# Patient Record
Sex: MALE | Race: WHITE | NOT HISPANIC OR LATINO | Employment: FULL TIME | URBAN - METROPOLITAN AREA
[De-identification: names, ages, dates, MRNs, and addresses within clinical notes are randomized per-mention and may not be internally consistent; named-entity substitution may affect disease eponyms.]

---

## 2017-01-26 ENCOUNTER — OFFICE VISIT (OUTPATIENT)
Dept: URGENT CARE | Facility: MEDICAL CENTER | Age: 37
End: 2017-01-26
Payer: COMMERCIAL

## 2017-01-26 PROCEDURE — 99213 OFFICE O/P EST LOW 20 MIN: CPT

## 2018-05-16 ENCOUNTER — OFFICE VISIT (OUTPATIENT)
Dept: FAMILY MEDICINE CLINIC | Facility: CLINIC | Age: 38
End: 2018-05-16
Payer: COMMERCIAL

## 2018-05-16 VITALS
BODY MASS INDEX: 27 KG/M2 | WEIGHT: 188.6 LBS | HEART RATE: 84 BPM | HEIGHT: 70 IN | DIASTOLIC BLOOD PRESSURE: 84 MMHG | TEMPERATURE: 97.7 F | SYSTOLIC BLOOD PRESSURE: 132 MMHG

## 2018-05-16 DIAGNOSIS — E78.00 HYPERCHOLESTEROLEMIA: ICD-10-CM

## 2018-05-16 DIAGNOSIS — Z00.00 ANNUAL PHYSICAL EXAM: Primary | ICD-10-CM

## 2018-05-16 DIAGNOSIS — E78.1 ESSENTIAL HYPERTRIGLYCERIDEMIA: ICD-10-CM

## 2018-05-16 PROCEDURE — 99385 PREV VISIT NEW AGE 18-39: CPT | Performed by: FAMILY MEDICINE

## 2018-05-16 NOTE — PROGRESS NOTES
Assessment/Plan:    Essential hypertriglyceridemia  Check labs    Hypercholesterolemia  Check labs       Diagnoses and all orders for this visit:    Annual physical exam  Comments:  normal exam    Essential hypertriglyceridemia  -     Comprehensive metabolic panel; Future  -     Lipid panel; Future  -     TSH, 3rd generation with T4 reflex; Future    Hypercholesterolemia  -     Comprehensive metabolic panel; Future  -     Lipid panel; Future  -     TSH, 3rd generation with T4 reflex; Future          Subjective:      Patient ID: Jaden Lawrence is a 42 y o  male  41 yo male here for PE and to be established in the practice  - pt with a hx of hyperlipidemia and a strong fam hx of CAD  Pt was on a statin from [facility] in the past but stopped several years ago  Pt runs 1 5 mi several times a week and lifts weights  No CP, palp, lightheadedness or other CV symptoms with or without exertion  - pt denies any other GI,  or extremity complaints  - I reviewed PMHx, PSHx, FamHx and Soc Hx with pt  The following portions of the patient's history were reviewed and updated as appropriate:   He  has no past medical history on file  He   Patient Active Problem List    Diagnosis Date Noted    Essential hypertriglyceridemia 10/29/2013    Hypercholesterolemia 10/29/2013     He  has no past surgical history on file  His family history includes Asthma in his brother; Heart attack in his mother; Heart disease in his mother; Heart disease (age of onset: 39) in his father; Hypertension in his mother  He  reports that he has never smoked  His smokeless tobacco use includes Chew  He reports that he drinks about 1 8 oz of alcohol per week   He reports that he does not use drugs  No current outpatient prescriptions on file  No current facility-administered medications for this visit  He is allergic to bee venom       Review of Systems   Constitutional: Negative  HENT: Negative  Eyes: Negative  Respiratory: Negative  Cardiovascular: Negative  Gastrointestinal: Negative  Endocrine: Negative  Genitourinary: Negative  Musculoskeletal: Negative  Skin: Negative  Allergic/Immunologic: Negative  Neurological: Negative  Hematological: Negative  Psychiatric/Behavioral: Negative  Objective:      /84   Pulse 84   Temp 97 7 °F (36 5 °C)   Ht 5' 10" (1 778 m)   Wt 85 5 kg (188 lb 9 6 oz)   BMI 27 06 kg/m²          Physical Exam   Constitutional: He is oriented to person, place, and time  He appears well-developed and well-nourished  HENT:   Head: Normocephalic and atraumatic  Right Ear: External ear normal    Left Ear: External ear normal    Nose: Nose normal    Mouth/Throat: Oropharynx is clear and moist    Eyes: Conjunctivae and EOM are normal  Pupils are equal, round, and reactive to light  Neck: Normal range of motion  Neck supple  No thyromegaly present  Cardiovascular: Normal rate, regular rhythm, normal heart sounds and intact distal pulses  No murmur heard  Pulmonary/Chest: Effort normal and breath sounds normal    Abdominal: Soft  Bowel sounds are normal  He exhibits no distension and no mass  There is no tenderness  Musculoskeletal: Normal range of motion  He exhibits no edema or tenderness  Lymphadenopathy:     He has no cervical adenopathy  Neurological: He is alert and oriented to person, place, and time  No cranial nerve deficit  Coordination normal    Skin: Skin is warm and dry  Psychiatric: He has a normal mood and affect  His behavior is normal  Judgment and thought content normal    Vitals reviewed

## 2018-05-18 ENCOUNTER — TRANSCRIBE ORDERS (OUTPATIENT)
Dept: LAB | Facility: CLINIC | Age: 38
End: 2018-05-18

## 2018-05-18 ENCOUNTER — APPOINTMENT (OUTPATIENT)
Dept: LAB | Facility: CLINIC | Age: 38
End: 2018-05-18
Payer: COMMERCIAL

## 2018-05-18 DIAGNOSIS — E78.00 HYPERCHOLESTEROLEMIA: Primary | ICD-10-CM

## 2018-05-18 DIAGNOSIS — E78.1 ESSENTIAL HYPERTRIGLYCERIDEMIA: ICD-10-CM

## 2018-05-18 DIAGNOSIS — E78.00 HYPERCHOLESTEROLEMIA: ICD-10-CM

## 2018-05-18 DIAGNOSIS — I10 ESSENTIAL HYPERTENSION: ICD-10-CM

## 2018-05-18 LAB
ALBUMIN SERPL BCP-MCNC: 4.3 G/DL (ref 3.5–5)
ALP SERPL-CCNC: 48 U/L (ref 46–116)
ALT SERPL W P-5'-P-CCNC: 51 U/L (ref 12–78)
ANION GAP SERPL CALCULATED.3IONS-SCNC: 7 MMOL/L (ref 4–13)
AST SERPL W P-5'-P-CCNC: 21 U/L (ref 5–45)
BILIRUB SERPL-MCNC: 0.4 MG/DL (ref 0.2–1)
BUN SERPL-MCNC: 14 MG/DL (ref 5–25)
CALCIUM SERPL-MCNC: 8.7 MG/DL (ref 8.3–10.1)
CHLORIDE SERPL-SCNC: 104 MMOL/L (ref 100–108)
CHOLEST SERPL-MCNC: 229 MG/DL (ref 50–200)
CO2 SERPL-SCNC: 29 MMOL/L (ref 21–32)
CREAT SERPL-MCNC: 0.96 MG/DL (ref 0.6–1.3)
GFR SERPL CREATININE-BSD FRML MDRD: 99 ML/MIN/1.73SQ M
GLUCOSE P FAST SERPL-MCNC: 97 MG/DL (ref 65–99)
HDLC SERPL-MCNC: 25 MG/DL (ref 40–60)
LDLC SERPL CALC-MCNC: 146 MG/DL (ref 0–100)
NONHDLC SERPL-MCNC: 204 MG/DL
POTASSIUM SERPL-SCNC: 4.1 MMOL/L (ref 3.5–5.3)
PROT SERPL-MCNC: 7.8 G/DL (ref 6.4–8.2)
SODIUM SERPL-SCNC: 140 MMOL/L (ref 136–145)
TRIGL SERPL-MCNC: 289 MG/DL
TSH SERPL DL<=0.05 MIU/L-ACNC: 3.58 UIU/ML (ref 0.36–3.74)

## 2018-05-18 PROCEDURE — 84443 ASSAY THYROID STIM HORMONE: CPT

## 2018-05-18 PROCEDURE — 36415 COLL VENOUS BLD VENIPUNCTURE: CPT

## 2018-05-18 PROCEDURE — 80053 COMPREHEN METABOLIC PANEL: CPT

## 2018-05-18 PROCEDURE — 80061 LIPID PANEL: CPT

## 2018-05-19 DIAGNOSIS — E78.00 HYPERCHOLESTEROLEMIA: Primary | ICD-10-CM

## 2018-05-19 RX ORDER — ATORVASTATIN CALCIUM 10 MG/1
10 TABLET, FILM COATED ORAL DAILY
Qty: 30 TABLET | Refills: 5 | Status: SHIPPED | OUTPATIENT
Start: 2018-05-19 | End: 2019-01-13 | Stop reason: SDUPTHER

## 2018-06-20 ENCOUNTER — OFFICE VISIT (OUTPATIENT)
Dept: URGENT CARE | Facility: MEDICAL CENTER | Age: 38
End: 2018-06-20
Payer: COMMERCIAL

## 2018-06-20 VITALS
TEMPERATURE: 98.2 F | BODY MASS INDEX: 26.34 KG/M2 | HEIGHT: 70 IN | SYSTOLIC BLOOD PRESSURE: 121 MMHG | DIASTOLIC BLOOD PRESSURE: 86 MMHG | WEIGHT: 184 LBS | OXYGEN SATURATION: 99 % | RESPIRATION RATE: 16 BRPM | HEART RATE: 66 BPM

## 2018-06-20 DIAGNOSIS — H65.92 LEFT NON-SUPPURATIVE OTITIS MEDIA: Primary | ICD-10-CM

## 2018-06-20 PROCEDURE — 99213 OFFICE O/P EST LOW 20 MIN: CPT | Performed by: PHYSICIAN ASSISTANT

## 2018-06-20 RX ORDER — CETIRIZINE HYDROCHLORIDE 5 MG/1
5 TABLET ORAL DAILY
COMMUNITY

## 2018-06-20 RX ORDER — AMOXICILLIN 500 MG/1
500 CAPSULE ORAL EVERY 12 HOURS SCHEDULED
Qty: 20 CAPSULE | Refills: 0 | Status: SHIPPED | OUTPATIENT
Start: 2018-06-20 | End: 2018-06-30

## 2018-06-20 NOTE — PROGRESS NOTES
[facility]        NAME: Burt Alegria is a 42 y o  male  : 1980    MRN: [id number]  DATE: 2018  TIME: 7:04 PM    Assessment and Plan   Left non-suppurative otitis media [H65 92]  1  Left non-suppurative otitis media  amoxicillin (AMOXIL) 500 mg capsule         Patient Instructions     Take amoxicillin twice daily for 10 days  Take with food to avoid GI upset and eat yogurt or a probiotic daily  Watch the swollen lymph node and follow up with your PCP if it persists after antibiotic therapy  Follow up with PCP in 3-5 days  Proceed to  ER if symptoms worsen  Chief Complaint     Chief Complaint   Patient presents with    Swollen Glands     left side of face/neck, 3-4 days         History of Present Illness       This is a 42year old male presenting for left sided painful lymph node x 5 days  The lymph node is left sided anterior cervical and pain radiates into the left ear  No history of ear infections, ear discharge, or fevers  Associated symptoms include sinus congestion from allergies for over a week and an episode of dizziness upon standing that occurred a few days ago  Review of Systems   Review of Systems   Constitutional: Negative for chills, fatigue and fever  HENT: Positive for congestion, ear pain, rhinorrhea, sinus pressure and sore throat  Negative for ear discharge  Respiratory: Negative for cough, shortness of breath and wheezing  Gastrointestinal: Negative for abdominal pain, diarrhea, nausea and vomiting  Musculoskeletal: Negative for arthralgias  Skin: Negative for rash  Neurological: Positive for dizziness  Negative for weakness and headaches           Current Medications       Current Outpatient Prescriptions:     cetirizine (ZyrTEC) 5 MG tablet, Take 5 mg by mouth daily, Disp: , Rfl:     amoxicillin (AMOXIL) 500 mg capsule, Take 1 capsule (500 mg total) by mouth every 12 (twelve) hours for 10 days, Disp: 20 capsule, Rfl: 0    atorvastatin (LIPITOR) 10 mg tablet, Take 1 tablet (10 mg total) by mouth daily, Disp: 30 tablet, Rfl: 5    Current Allergies     Allergies as of 06/20/2018 - Reviewed 06/20/2018   Allergen Reaction Noted    Bee venom  10/29/2013            The following portions of the patient's history were reviewed and updated as appropriate: allergies, current medications, past family history, past medical history, past social history, past surgical history and problem list      No past medical history on file  No past surgical history on file  Family History   Problem Relation Age of Onset    Heart disease Mother         cardiac disorder    Hypertension Mother     Heart attack Mother         prior     Heart disease Father 39        CABG    Asthma Brother          Medications have been verified  Objective   /86 (BP Location: Right arm, Patient Position: Sitting)   Pulse 66   Temp 98 2 °F (36 8 °C) (Temporal)   Resp 16   Ht 5' 10" (1 778 m)   Wt 83 5 kg (184 lb)   SpO2 99%   BMI 26 40 kg/m²        Physical Exam     Physical Exam   Constitutional: He appears well-developed and well-nourished  No distress  HENT:   Head: Normocephalic  Right Ear: Tympanic membrane, external ear and ear canal normal  No drainage or tenderness  Left Ear: External ear and ear canal normal  No drainage or tenderness  Tympanic membrane is injected, erythematous and bulging  Nose: Nose normal  Right sinus exhibits no maxillary sinus tenderness and no frontal sinus tenderness  Left sinus exhibits no maxillary sinus tenderness and no frontal sinus tenderness  Mouth/Throat: Uvula is midline, oropharynx is clear and moist and mucous membranes are normal  No oropharyngeal exudate, posterior oropharyngeal edema or posterior oropharyngeal erythema  Eyes: Conjunctivae and EOM are normal  Pupils are equal, round, and reactive to light  Neck: Normal range of motion  Neck supple     Cardiovascular: Normal rate, regular rhythm, normal heart sounds and intact distal pulses  Pulmonary/Chest: Effort normal and breath sounds normal  No respiratory distress  He has no decreased breath sounds  He has no wheezes  He has no rhonchi  He has no rales  Lymphadenopathy:     He has cervical adenopathy  Neurological: He is alert  Skin: Skin is warm and dry  He is not diaphoretic  Nursing note and vitals reviewed

## 2018-06-20 NOTE — PATIENT INSTRUCTIONS
Take amoxicillin twice daily for 10 days  Take with food to avoid GI upset and eat yogurt or a probiotic daily  Watch the swollen lymph node and follow up with your PCP if it persists after antibiotic therapy  Go to the ER for any distress

## 2018-10-02 ENCOUNTER — APPOINTMENT (OUTPATIENT)
Dept: LAB | Facility: CLINIC | Age: 38
End: 2018-10-02
Payer: COMMERCIAL

## 2018-10-02 ENCOUNTER — TRANSCRIBE ORDERS (OUTPATIENT)
Dept: RADIOLOGY | Facility: CLINIC | Age: 38
End: 2018-10-02

## 2018-10-02 DIAGNOSIS — E78.00 HYPERCHOLESTEROLEMIA: ICD-10-CM

## 2018-10-02 LAB
ALBUMIN SERPL BCP-MCNC: 4 G/DL (ref 3.5–5)
ALP SERPL-CCNC: 55 U/L (ref 46–116)
ALT SERPL W P-5'-P-CCNC: 43 U/L (ref 12–78)
ANION GAP SERPL CALCULATED.3IONS-SCNC: 9 MMOL/L (ref 4–13)
AST SERPL W P-5'-P-CCNC: 18 U/L (ref 5–45)
BILIRUB SERPL-MCNC: 0.7 MG/DL (ref 0.2–1)
BUN SERPL-MCNC: 10 MG/DL (ref 5–25)
CALCIUM SERPL-MCNC: 8.9 MG/DL (ref 8.3–10.1)
CHLORIDE SERPL-SCNC: 105 MMOL/L (ref 100–108)
CHOLEST SERPL-MCNC: 155 MG/DL (ref 50–200)
CO2 SERPL-SCNC: 27 MMOL/L (ref 21–32)
CREAT SERPL-MCNC: 0.92 MG/DL (ref 0.6–1.3)
GFR SERPL CREATININE-BSD FRML MDRD: 104 ML/MIN/1.73SQ M
GLUCOSE P FAST SERPL-MCNC: 96 MG/DL (ref 65–99)
HDLC SERPL-MCNC: 30 MG/DL (ref 40–60)
LDLC SERPL CALC-MCNC: 100 MG/DL (ref 0–100)
NONHDLC SERPL-MCNC: 125 MG/DL
POTASSIUM SERPL-SCNC: 4.4 MMOL/L (ref 3.5–5.3)
PROT SERPL-MCNC: 7.5 G/DL (ref 6.4–8.2)
SODIUM SERPL-SCNC: 141 MMOL/L (ref 136–145)
TRIGL SERPL-MCNC: 125 MG/DL

## 2018-10-02 PROCEDURE — 36415 COLL VENOUS BLD VENIPUNCTURE: CPT

## 2018-10-02 PROCEDURE — 80061 LIPID PANEL: CPT

## 2018-10-02 PROCEDURE — 80053 COMPREHEN METABOLIC PANEL: CPT

## 2019-01-13 DIAGNOSIS — E78.00 HYPERCHOLESTEROLEMIA: ICD-10-CM

## 2019-01-13 RX ORDER — ATORVASTATIN CALCIUM 10 MG/1
TABLET, FILM COATED ORAL
Qty: 30 TABLET | Refills: 5 | Status: SHIPPED | OUTPATIENT
Start: 2019-01-13 | End: 2019-10-18 | Stop reason: SDUPTHER

## 2019-03-14 ENCOUNTER — OFFICE VISIT (OUTPATIENT)
Dept: URGENT CARE | Facility: MEDICAL CENTER | Age: 39
End: 2019-03-14
Payer: COMMERCIAL

## 2019-03-14 VITALS
RESPIRATION RATE: 16 BRPM | HEART RATE: 65 BPM | HEIGHT: 70 IN | BODY MASS INDEX: 28.23 KG/M2 | WEIGHT: 197.2 LBS | SYSTOLIC BLOOD PRESSURE: 118 MMHG | TEMPERATURE: 96.9 F | OXYGEN SATURATION: 100 % | DIASTOLIC BLOOD PRESSURE: 92 MMHG

## 2019-03-14 DIAGNOSIS — H65.111 ACUTE MUCOID OTITIS MEDIA OF RIGHT EAR: Primary | ICD-10-CM

## 2019-03-14 PROCEDURE — 99213 OFFICE O/P EST LOW 20 MIN: CPT | Performed by: PHYSICIAN ASSISTANT

## 2019-03-14 RX ORDER — AMOXICILLIN 500 MG/1
500 TABLET, FILM COATED ORAL 3 TIMES DAILY
Qty: 30 TABLET | Refills: 0 | Status: SHIPPED | OUTPATIENT
Start: 2019-03-14 | End: 2019-03-24

## 2019-03-14 NOTE — PROGRESS NOTES
[facility]        NAME: Wil Cisneros is a 34 y o  male  : 1980    MRN: [id number]  DATE: 2019  TIME: 1:52 PM    Assessment and Plan   Acute mucoid otitis media of right ear [H65 111]  1  Acute mucoid otitis media of right ear  amoxicillin (AMOXIL) 500 MG tablet         Patient Instructions     1  Take Amox 500mg  1 tablet 3x daily x 10 days  2  Motrin as needed for ear pain  3  Follow up with PCP in 3-5 days if symptoms persist       Chief Complaint     Chief Complaint   Patient presents with    Earache     x 3 days ago pt reports having pain in his right ear and neck-         History of Present Illness       The patient is a 47-year-old male presents with increasing right-sided ear pain x3 days  He denies any fever, ear drainage or change in hearing since the onset of symptoms  Patient reports viral syndrome 1 week prior to the onset of his ear pain  Review of Systems   Review of Systems   Constitutional: Negative  HENT: Positive for congestion and ear pain  Negative for ear discharge and rhinorrhea  Respiratory: Negative for cough  Gastrointestinal: Negative            Current Medications       Current Outpatient Medications:     atorvastatin (LIPITOR) 10 mg tablet, TAKE 1 TABLET BY MOUTH EVERY DAY, Disp: 30 tablet, Rfl: 5    cetirizine (ZyrTEC) 5 MG tablet, Take 5 mg by mouth daily, Disp: , Rfl:     amoxicillin (AMOXIL) 500 MG tablet, Take 1 tablet (500 mg total) by mouth 3 (three) times a day for 10 days, Disp: 30 tablet, Rfl: 0    Current Allergies     Allergies as of 2019 - Reviewed 2019   Allergen Reaction Noted    Bee venom  10/29/2013            The following portions of the patient's history were reviewed and updated as appropriate: allergies, current medications, past family history, past medical history, past social history, past surgical history and problem list      Past Medical History:   Diagnosis Date    Allergic     High cholesterol History reviewed  No pertinent surgical history  Family History   Problem Relation Age of Onset    Heart disease Mother         cardiac disorder    Hypertension Mother     Heart attack Mother         prior     Heart disease Father 39        CABG    Asthma Brother          Medications have been verified  Objective   /92   Pulse 65   Temp (!) 96 9 °F (36 1 °C) (Temporal)   Resp 16   Ht 5' 10" (1 778 m)   Wt 89 4 kg (197 lb 3 2 oz)   SpO2 100%   BMI 28 30 kg/m²        Physical Exam     Physical Exam   Constitutional: He appears well-developed and well-nourished  No distress  HENT:   Head: Normocephalic and atraumatic  Right Ear: Tympanic membrane is erythematous  A middle ear effusion is present  Left Ear: Tympanic membrane and ear canal normal    Ears:    Cardiovascular: Normal rate, regular rhythm and normal heart sounds  No murmur heard    Pulmonary/Chest: Effort normal and breath sounds normal  Angiocath

## 2019-06-02 ENCOUNTER — OFFICE VISIT (OUTPATIENT)
Dept: URGENT CARE | Facility: MEDICAL CENTER | Age: 39
End: 2019-06-02
Payer: COMMERCIAL

## 2019-06-02 VITALS
OXYGEN SATURATION: 96 % | BODY MASS INDEX: 29.06 KG/M2 | SYSTOLIC BLOOD PRESSURE: 137 MMHG | TEMPERATURE: 97.8 F | WEIGHT: 203 LBS | RESPIRATION RATE: 20 BRPM | HEART RATE: 86 BPM | HEIGHT: 70 IN | DIASTOLIC BLOOD PRESSURE: 94 MMHG

## 2019-06-02 DIAGNOSIS — H66.91 RIGHT OTITIS MEDIA, UNSPECIFIED OTITIS MEDIA TYPE: Primary | ICD-10-CM

## 2019-06-02 PROCEDURE — 99213 OFFICE O/P EST LOW 20 MIN: CPT | Performed by: PHYSICIAN ASSISTANT

## 2019-06-02 RX ORDER — AMOXICILLIN 500 MG/1
500 CAPSULE ORAL EVERY 8 HOURS SCHEDULED
Qty: 21 CAPSULE | Refills: 0 | Status: SHIPPED | OUTPATIENT
Start: 2019-06-02 | End: 2019-06-09

## 2019-10-18 DIAGNOSIS — E78.00 HYPERCHOLESTEROLEMIA: ICD-10-CM

## 2019-10-18 RX ORDER — ATORVASTATIN CALCIUM 10 MG/1
TABLET, FILM COATED ORAL
Qty: 90 TABLET | Refills: 1 | Status: SHIPPED | OUTPATIENT
Start: 2019-10-18 | End: 2019-12-27 | Stop reason: SDUPTHER

## 2019-12-27 DIAGNOSIS — E78.00 HYPERCHOLESTEROLEMIA: ICD-10-CM

## 2019-12-27 RX ORDER — ATORVASTATIN CALCIUM 10 MG/1
10 TABLET, FILM COATED ORAL DAILY
Qty: 90 TABLET | Refills: 1 | Status: SHIPPED | OUTPATIENT
Start: 2019-12-27 | End: 2020-08-23

## 2020-02-14 ENCOUNTER — OFFICE VISIT (OUTPATIENT)
Dept: URGENT CARE | Facility: MEDICAL CENTER | Age: 40
End: 2020-02-14
Payer: COMMERCIAL

## 2020-02-14 VITALS
BODY MASS INDEX: 27.92 KG/M2 | RESPIRATION RATE: 20 BRPM | HEART RATE: 122 BPM | SYSTOLIC BLOOD PRESSURE: 130 MMHG | DIASTOLIC BLOOD PRESSURE: 80 MMHG | WEIGHT: 195 LBS | OXYGEN SATURATION: 98 % | TEMPERATURE: 99 F | HEIGHT: 70 IN

## 2020-02-14 DIAGNOSIS — J02.9 SORE THROAT: Primary | ICD-10-CM

## 2020-02-14 LAB — S PYO AG THROAT QL: NEGATIVE

## 2020-02-14 PROCEDURE — G0382 LEV 3 HOSP TYPE B ED VISIT: HCPCS | Performed by: PHYSICIAN ASSISTANT

## 2020-02-14 PROCEDURE — 87880 STREP A ASSAY W/OPTIC: CPT | Performed by: PHYSICIAN ASSISTANT

## 2020-02-14 PROCEDURE — S9083 URGENT CARE CENTER GLOBAL: HCPCS | Performed by: PHYSICIAN ASSISTANT

## 2020-02-14 NOTE — PROGRESS NOTES
[facility]        NAME: Lawerence Lesch is a 37 y o  male  : 1980    MRN: [id number]  DATE: 2020  TIME: 1:39 PM    Assessment and Plan   Sore throat [J02 9]  1  Sore throat  POCT rapid strepA         Patient Instructions     Tylenol or Motrin for pain   warm saltwater gargles   hot liquids   follow-up with PCP if symptoms persist    Chief Complaint     Chief Complaint   Patient presents with    Sore Throat     started last night with sore throat , aches,          History of Present Illness        Patient is a 56-year-old male who presents today with sore throat and body aches starting last night  No fevers or chills  No cough or congestion  His girlfriend was recently sick with a cold  Review of Systems   Review of Systems   Constitutional: Negative for chills and fever  HENT: Positive for sore throat  Negative for congestion and ear pain  Respiratory: Negative for cough and shortness of breath  Cardiovascular: Negative for chest pain  Musculoskeletal: Positive for myalgias  Current Medications       Current Outpatient Medications:     atorvastatin (LIPITOR) 10 mg tablet, Take 1 tablet (10 mg total) by mouth daily, Disp: 90 tablet, Rfl: 1    cetirizine (ZyrTEC) 5 MG tablet, Take 5 mg by mouth daily, Disp: , Rfl:     Current Allergies     Allergies as of 2020 - Reviewed 2020   Allergen Reaction Noted    Bee venom  10/29/2013            The following portions of the patient's history were reviewed and updated as appropriate: allergies, current medications, past family history, past medical history, past social history, past surgical history and problem list      Past Medical History:   Diagnosis Date    Allergic     High cholesterol        History reviewed  No pertinent surgical history      Family History   Problem Relation Age of Onset    Heart disease Mother         cardiac disorder    Hypertension Mother     Heart attack Mother         prior  Heart disease Father 39        CABG    Asthma Brother          Medications have been verified  Objective   /80   Pulse (!) 122   Temp 99 °F (37 2 °C)   Resp 20   Ht 5' 10" (1 778 m)   Wt 88 5 kg (195 lb)   SpO2 98%   BMI 27 98 kg/m²        Physical Exam     Physical Exam   Constitutional: He appears well-developed and well-nourished  No distress  HENT:   Head: Normocephalic and atraumatic  Right Ear: Tympanic membrane and ear canal normal    Left Ear: Tympanic membrane and ear canal normal    Mouth/Throat: Uvula is midline and mucous membranes are normal  No oral lesions  No uvula swelling  Posterior oropharyngeal erythema present  No oropharyngeal exudate, posterior oropharyngeal edema or tonsillar abscesses  Tonsils are 0 on the right  Tonsils are 0 on the left  No tonsillar exudate  Neck: Neck supple  Cardiovascular: Normal rate and regular rhythm  Pulmonary/Chest: Effort normal and breath sounds normal    Lymphadenopathy:     He has no cervical adenopathy  Skin: Skin is warm and dry

## 2020-02-27 ENCOUNTER — OFFICE VISIT (OUTPATIENT)
Dept: URGENT CARE | Facility: MEDICAL CENTER | Age: 40
End: 2020-02-27
Payer: COMMERCIAL

## 2020-02-27 VITALS
RESPIRATION RATE: 18 BRPM | OXYGEN SATURATION: 98 % | WEIGHT: 195 LBS | HEART RATE: 116 BPM | DIASTOLIC BLOOD PRESSURE: 80 MMHG | HEIGHT: 70 IN | SYSTOLIC BLOOD PRESSURE: 136 MMHG | TEMPERATURE: 97.3 F | BODY MASS INDEX: 27.92 KG/M2

## 2020-02-27 DIAGNOSIS — J01.00 ACUTE MAXILLARY SINUSITIS, RECURRENCE NOT SPECIFIED: ICD-10-CM

## 2020-02-27 DIAGNOSIS — J02.9 SORE THROAT: Primary | ICD-10-CM

## 2020-02-27 LAB — S PYO AG THROAT QL: NEGATIVE

## 2020-02-27 PROCEDURE — 87880 STREP A ASSAY W/OPTIC: CPT | Performed by: PHYSICIAN ASSISTANT

## 2020-02-27 PROCEDURE — 99213 OFFICE O/P EST LOW 20 MIN: CPT | Performed by: PHYSICIAN ASSISTANT

## 2020-02-27 RX ORDER — AMOXICILLIN 500 MG/1
500 TABLET, FILM COATED ORAL 3 TIMES DAILY
Qty: 30 TABLET | Refills: 0 | Status: SHIPPED | OUTPATIENT
Start: 2020-02-27 | End: 2020-03-08

## 2020-02-27 NOTE — PATIENT INSTRUCTIONS
1  Motrin as needed for headache/sinus pressure  2  Take Amox 500mg  3x daily x 10 days  3  Follow up with PCP in 3-5 days if symptoms persist   4  Proceed to  ER if symptoms worsen

## 2020-02-27 NOTE — PROGRESS NOTES
[facility]        NAME: Mariam Jones is a 37 y o  male  : 1980    MRN: [id number]  DATE: 2020  TIME: 4:28 PM    Assessment and Plan   Sore throat [J02 9]  1  Sore throat  POCT rapid strepA   2  Acute maxillary sinusitis, recurrence not specified  amoxicillin (AMOXIL) 500 MG tablet         Patient Instructions     1  Motrin as needed for headache/sinus pressure  2  Take Amox 500mg  3x daily x 10 days  3  Follow up with PCP in 3-5 days if symptoms persist   4  Proceed to  ER if symptoms worsen  Chief Complaint     Chief Complaint   Patient presents with    Sore Throat     x2 weeks with a sore throat and difficulty swallowing    Cough     non productive (denies wheezing or sob)(denies fevers)    Nasal Congestion         History of Present Illness       Roman Roberts is a 66-year-old male presents with a 2 week history of persistent sore throat, postnasal drip, headache and sinus pressure  Patient denies any fever, chills or vomiting since the onset of his symptoms  Patient reports his nasal drainage and sinus pressure of increased steadily over the past 7 days  Review of Systems   Review of Systems   Constitutional: Negative  HENT: Positive for congestion, postnasal drip, rhinorrhea, sinus pressure and sore throat  Respiratory: Positive for cough            Current Medications       Current Outpatient Medications:     atorvastatin (LIPITOR) 10 mg tablet, Take 1 tablet (10 mg total) by mouth daily, Disp: 90 tablet, Rfl: 1    cetirizine (ZyrTEC) 5 MG tablet, Take 5 mg by mouth daily, Disp: , Rfl:     amoxicillin (AMOXIL) 500 MG tablet, Take 1 tablet (500 mg total) by mouth 3 (three) times a day for 10 days, Disp: 30 tablet, Rfl: 0    Current Allergies     Allergies as of 2020 - Reviewed 2020   Allergen Reaction Noted    Bee venom  10/29/2013            The following portions of the patient's history were reviewed and updated as appropriate: allergies, current medications, past family history, past medical history, past social history, past surgical history and problem list      Past Medical History:   Diagnosis Date    Allergic     High cholesterol        History reviewed  No pertinent surgical history  Family History   Problem Relation Age of Onset    Heart disease Mother         cardiac disorder    Hypertension Mother     Heart attack Mother         prior     Heart disease Father 39        CABG    Asthma Brother          Medications have been verified  Objective   /80   Pulse (!) 116   Temp (!) 97 3 °F (36 3 °C) (Temporal)   Resp 18   Ht 5' 10" (1 778 m)   Wt 88 5 kg (195 lb)   SpO2 98%   BMI 27 98 kg/m²        Physical Exam     Physical Exam   Constitutional: He appears well-developed and well-nourished  HENT:   Head: Normocephalic and atraumatic  Right Ear: Tympanic membrane and ear canal normal    Left Ear: Tympanic membrane and ear canal normal    Nose: Rhinorrhea present  Right sinus exhibits maxillary sinus tenderness  Left sinus exhibits maxillary sinus tenderness  Mouth/Throat: Uvula is midline and mucous membranes are normal  Posterior oropharyngeal erythema present  No posterior oropharyngeal edema  Cardiovascular: Normal rate, regular rhythm and normal heart sounds  No murmur heard    Pulmonary/Chest: Effort normal and breath sounds normal

## 2020-04-07 ENCOUNTER — OFFICE VISIT (OUTPATIENT)
Dept: FAMILY MEDICINE CLINIC | Facility: CLINIC | Age: 40
End: 2020-04-07
Payer: COMMERCIAL

## 2020-04-07 VITALS
DIASTOLIC BLOOD PRESSURE: 82 MMHG | HEART RATE: 84 BPM | HEIGHT: 70 IN | SYSTOLIC BLOOD PRESSURE: 124 MMHG | RESPIRATION RATE: 16 BRPM | TEMPERATURE: 97.8 F | WEIGHT: 199.2 LBS | BODY MASS INDEX: 28.52 KG/M2

## 2020-04-07 DIAGNOSIS — Z00.00 ANNUAL PHYSICAL EXAM: Primary | ICD-10-CM

## 2020-04-07 DIAGNOSIS — E78.00 HYPERCHOLESTEROLEMIA: ICD-10-CM

## 2020-04-07 DIAGNOSIS — E78.1 ESSENTIAL HYPERTRIGLYCERIDEMIA: ICD-10-CM

## 2020-04-07 PROCEDURE — 99396 PREV VISIT EST AGE 40-64: CPT | Performed by: FAMILY MEDICINE

## 2020-04-07 PROCEDURE — 3008F BODY MASS INDEX DOCD: CPT | Performed by: FAMILY MEDICINE

## 2020-04-09 ENCOUNTER — APPOINTMENT (OUTPATIENT)
Dept: LAB | Facility: MEDICAL CENTER | Age: 40
End: 2020-04-09
Payer: COMMERCIAL

## 2020-04-09 DIAGNOSIS — E78.00 HYPERCHOLESTEROLEMIA: ICD-10-CM

## 2020-04-09 DIAGNOSIS — E78.1 ESSENTIAL HYPERTRIGLYCERIDEMIA: ICD-10-CM

## 2020-04-09 LAB
ALBUMIN SERPL BCP-MCNC: 4 G/DL (ref 3.5–5)
ALP SERPL-CCNC: 58 U/L (ref 46–116)
ALT SERPL W P-5'-P-CCNC: 82 U/L (ref 12–78)
ANION GAP SERPL CALCULATED.3IONS-SCNC: 2 MMOL/L (ref 4–13)
AST SERPL W P-5'-P-CCNC: 33 U/L (ref 5–45)
BASOPHILS # BLD AUTO: 0.06 THOUSANDS/ΜL (ref 0–0.1)
BASOPHILS NFR BLD AUTO: 1 % (ref 0–1)
BILIRUB SERPL-MCNC: 0.4 MG/DL (ref 0.2–1)
BUN SERPL-MCNC: 10 MG/DL (ref 5–25)
CALCIUM SERPL-MCNC: 9 MG/DL (ref 8.3–10.1)
CHLORIDE SERPL-SCNC: 108 MMOL/L (ref 100–108)
CHOLEST SERPL-MCNC: 168 MG/DL (ref 50–200)
CO2 SERPL-SCNC: 28 MMOL/L (ref 21–32)
CREAT SERPL-MCNC: 0.86 MG/DL (ref 0.6–1.3)
EOSINOPHIL # BLD AUTO: 0.47 THOUSAND/ΜL (ref 0–0.61)
EOSINOPHIL NFR BLD AUTO: 7 % (ref 0–6)
ERYTHROCYTE [DISTWIDTH] IN BLOOD BY AUTOMATED COUNT: 12.8 % (ref 11.6–15.1)
GFR SERPL CREATININE-BSD FRML MDRD: 108 ML/MIN/1.73SQ M
GLUCOSE P FAST SERPL-MCNC: 94 MG/DL (ref 65–99)
HCT VFR BLD AUTO: 46.2 % (ref 36.5–49.3)
HDLC SERPL-MCNC: 26 MG/DL
HGB BLD-MCNC: 14.8 G/DL (ref 12–17)
IMM GRANULOCYTES # BLD AUTO: 0.01 THOUSAND/UL (ref 0–0.2)
IMM GRANULOCYTES NFR BLD AUTO: 0 % (ref 0–2)
LDLC SERPL CALC-MCNC: 108 MG/DL (ref 0–100)
LYMPHOCYTES # BLD AUTO: 2.69 THOUSANDS/ΜL (ref 0.6–4.47)
LYMPHOCYTES NFR BLD AUTO: 42 % (ref 14–44)
MCH RBC QN AUTO: 28.8 PG (ref 26.8–34.3)
MCHC RBC AUTO-ENTMCNC: 32 G/DL (ref 31.4–37.4)
MCV RBC AUTO: 90 FL (ref 82–98)
MONOCYTES # BLD AUTO: 0.56 THOUSAND/ΜL (ref 0.17–1.22)
MONOCYTES NFR BLD AUTO: 9 % (ref 4–12)
NEUTROPHILS # BLD AUTO: 2.62 THOUSANDS/ΜL (ref 1.85–7.62)
NEUTS SEG NFR BLD AUTO: 41 % (ref 43–75)
NONHDLC SERPL-MCNC: 142 MG/DL
NRBC BLD AUTO-RTO: 0 /100 WBCS
PLATELET # BLD AUTO: 305 THOUSANDS/UL (ref 149–390)
PMV BLD AUTO: 9.8 FL (ref 8.9–12.7)
POTASSIUM SERPL-SCNC: 4.1 MMOL/L (ref 3.5–5.3)
PROT SERPL-MCNC: 7.9 G/DL (ref 6.4–8.2)
RBC # BLD AUTO: 5.14 MILLION/UL (ref 3.88–5.62)
SODIUM SERPL-SCNC: 138 MMOL/L (ref 136–145)
TRIGL SERPL-MCNC: 172 MG/DL
TSH SERPL DL<=0.05 MIU/L-ACNC: 1.31 UIU/ML (ref 0.36–3.74)
WBC # BLD AUTO: 6.41 THOUSAND/UL (ref 4.31–10.16)

## 2020-04-09 PROCEDURE — 80053 COMPREHEN METABOLIC PANEL: CPT

## 2020-04-09 PROCEDURE — 80061 LIPID PANEL: CPT

## 2020-04-09 PROCEDURE — 84443 ASSAY THYROID STIM HORMONE: CPT

## 2020-04-09 PROCEDURE — 36415 COLL VENOUS BLD VENIPUNCTURE: CPT

## 2020-04-09 PROCEDURE — 85025 COMPLETE CBC W/AUTO DIFF WBC: CPT

## 2020-04-14 DIAGNOSIS — R74.01 ELEVATED TRANSAMINASE LEVEL: Primary | ICD-10-CM

## 2020-04-23 ENCOUNTER — TELEPHONE (OUTPATIENT)
Dept: FAMILY MEDICINE CLINIC | Facility: CLINIC | Age: 40
End: 2020-04-23

## 2020-04-24 ENCOUNTER — CLINICAL SUPPORT (OUTPATIENT)
Dept: FAMILY MEDICINE CLINIC | Facility: CLINIC | Age: 40
End: 2020-04-24
Payer: COMMERCIAL

## 2020-04-24 DIAGNOSIS — L23.7 POISON IVY DERMATITIS: Primary | ICD-10-CM

## 2020-04-24 DIAGNOSIS — L23.7 POISON IVY: Primary | ICD-10-CM

## 2020-04-24 PROCEDURE — 96372 THER/PROPH/DIAG INJ SC/IM: CPT | Performed by: FAMILY MEDICINE

## 2020-04-24 RX ORDER — METHYLPREDNISOLONE ACETATE 80 MG/ML
80 INJECTION, SUSPENSION INTRA-ARTICULAR; INTRALESIONAL; INTRAMUSCULAR; SOFT TISSUE ONCE
Status: COMPLETED | OUTPATIENT
Start: 2020-04-24 | End: 2020-04-24

## 2020-04-24 RX ADMIN — METHYLPREDNISOLONE ACETATE 80 MG: 80 INJECTION, SUSPENSION INTRA-ARTICULAR; INTRALESIONAL; INTRAMUSCULAR; SOFT TISSUE at 09:34

## 2020-05-22 ENCOUNTER — APPOINTMENT (OUTPATIENT)
Dept: LAB | Facility: MEDICAL CENTER | Age: 40
End: 2020-05-22
Payer: COMMERCIAL

## 2020-05-22 DIAGNOSIS — R74.01 ELEVATED TRANSAMINASE LEVEL: ICD-10-CM

## 2020-05-22 LAB
ALBUMIN SERPL BCP-MCNC: 4.4 G/DL (ref 3.5–5)
ALP SERPL-CCNC: 54 U/L (ref 46–116)
ALT SERPL W P-5'-P-CCNC: 69 U/L (ref 12–78)
ANION GAP SERPL CALCULATED.3IONS-SCNC: 1 MMOL/L (ref 4–13)
AST SERPL W P-5'-P-CCNC: 36 U/L (ref 5–45)
BILIRUB SERPL-MCNC: 0.63 MG/DL (ref 0.2–1)
BUN SERPL-MCNC: 14 MG/DL (ref 5–25)
CALCIUM SERPL-MCNC: 9.8 MG/DL (ref 8.3–10.1)
CHLORIDE SERPL-SCNC: 107 MMOL/L (ref 100–108)
CO2 SERPL-SCNC: 29 MMOL/L (ref 21–32)
CREAT SERPL-MCNC: 0.93 MG/DL (ref 0.6–1.3)
GFR SERPL CREATININE-BSD FRML MDRD: 102 ML/MIN/1.73SQ M
GLUCOSE P FAST SERPL-MCNC: 87 MG/DL (ref 65–99)
POTASSIUM SERPL-SCNC: 4.7 MMOL/L (ref 3.5–5.3)
PROT SERPL-MCNC: 7.9 G/DL (ref 6.4–8.2)
SODIUM SERPL-SCNC: 137 MMOL/L (ref 136–145)

## 2020-05-22 PROCEDURE — 36415 COLL VENOUS BLD VENIPUNCTURE: CPT

## 2020-05-22 PROCEDURE — 80053 COMPREHEN METABOLIC PANEL: CPT

## 2020-08-23 DIAGNOSIS — E78.00 HYPERCHOLESTEROLEMIA: ICD-10-CM

## 2020-08-23 RX ORDER — ATORVASTATIN CALCIUM 10 MG/1
TABLET, FILM COATED ORAL
Qty: 90 TABLET | Refills: 1 | Status: SHIPPED | OUTPATIENT
Start: 2020-08-23 | End: 2020-11-06 | Stop reason: SDUPTHER

## 2020-11-06 DIAGNOSIS — E78.00 HYPERCHOLESTEROLEMIA: ICD-10-CM

## 2020-11-06 RX ORDER — ATORVASTATIN CALCIUM 10 MG/1
10 TABLET, FILM COATED ORAL DAILY
Qty: 90 TABLET | Refills: 0 | Status: SHIPPED | OUTPATIENT
Start: 2020-11-06 | End: 2021-02-01

## 2021-02-01 DIAGNOSIS — E78.00 HYPERCHOLESTEROLEMIA: ICD-10-CM

## 2021-02-01 RX ORDER — ATORVASTATIN CALCIUM 10 MG/1
TABLET, FILM COATED ORAL
Qty: 90 TABLET | Refills: 0 | Status: SHIPPED | OUTPATIENT
Start: 2021-02-01 | End: 2021-05-10

## 2021-03-28 ENCOUNTER — OFFICE VISIT (OUTPATIENT)
Dept: URGENT CARE | Facility: MEDICAL CENTER | Age: 41
End: 2021-03-28
Payer: COMMERCIAL

## 2021-03-28 VITALS
TEMPERATURE: 97.7 F | SYSTOLIC BLOOD PRESSURE: 134 MMHG | HEIGHT: 70 IN | HEART RATE: 84 BPM | DIASTOLIC BLOOD PRESSURE: 79 MMHG | WEIGHT: 193 LBS | RESPIRATION RATE: 18 BRPM | OXYGEN SATURATION: 96 % | BODY MASS INDEX: 27.63 KG/M2

## 2021-03-28 DIAGNOSIS — R42 DIZZINESS AND GIDDINESS: ICD-10-CM

## 2021-03-28 DIAGNOSIS — H65.93 MIDDLE EAR EFFUSION, BILATERAL: Primary | ICD-10-CM

## 2021-03-28 PROCEDURE — S9083 URGENT CARE CENTER GLOBAL: HCPCS | Performed by: PHYSICIAN ASSISTANT

## 2021-03-28 PROCEDURE — G0382 LEV 3 HOSP TYPE B ED VISIT: HCPCS | Performed by: PHYSICIAN ASSISTANT

## 2021-03-28 RX ORDER — PREDNISONE 20 MG/1
20 TABLET ORAL DAILY
Qty: 5 TABLET | Refills: 0 | Status: SHIPPED | OUTPATIENT
Start: 2021-03-28 | End: 2021-04-08 | Stop reason: ALTCHOICE

## 2021-03-28 NOTE — PROGRESS NOTES
[facility]        NAME: Jourdan Rothman is a 41 y o  male  : 1980    MRN: [id number]  DATE: 2021  TIME: 7:09 PM    Assessment and Plan   Middle ear effusion, bilateral [H65 93]  1  Middle ear effusion, bilateral  predniSONE 20 mg tablet   2  Dizziness and giddiness  predniSONE 20 mg tablet       Patient does have some fluid behind his ears and this may be cause of dizziness  Will treat with steroids at this point  Recommended follow-up with PCP if symptoms do not improve  Patient Instructions     Steroids as directed  Follow up with PCP in 3-5 days  Proceed to  ER if symptoms worsen  Chief Complaint     Chief Complaint   Patient presents with    Dizziness     Pt  with intermittent dizziness when he moves his head  History of Present Illness        Patient is a 49-year-old male who presents today with complaints of intermittent dizziness and weakness for the past 3 days  Reports 2 or 3 spells of dizziness that lasts a few seconds  Notices it worsens with head movement  Denies any ear pain or discharge  No upper respiratory symptoms  No chest pain or shortness of breath  Only medical problem is hyperlipidemia for which he takes a statin  He denies any headache, nausea, vomiting, photophobia  Review of Systems   Review of Systems   Constitutional: Negative for fever  HENT: Negative for congestion, ear pain and sore throat  Eyes: Negative for photophobia and visual disturbance  Respiratory: Negative for shortness of breath  Cardiovascular: Negative for chest pain and leg swelling  Gastrointestinal: Negative for nausea and vomiting  Skin: Negative for color change  Neurological: Positive for dizziness  Negative for seizures, syncope, speech difficulty, weakness, light-headedness, numbness and headaches           Current Medications       Current Outpatient Medications:     atorvastatin (LIPITOR) 10 mg tablet, TAKE 1 TABLET BY MOUTH EVERY DAY, Disp: 90 tablet, Rfl: 0    cetirizine (ZyrTEC) 5 MG tablet, Take 5 mg by mouth daily, Disp: , Rfl:     Multiple Vitamins-Minerals (PRESERVISION AREDS PO), Take by mouth, Disp: , Rfl:     predniSONE 20 mg tablet, Take 1 tablet (20 mg total) by mouth daily, Disp: 5 tablet, Rfl: 0    Current Allergies     Allergies as of 03/28/2021 - Reviewed 03/28/2021   Allergen Reaction Noted    Bee venom  10/29/2013            The following portions of the patient's history were reviewed and updated as appropriate: allergies, current medications, past family history, past medical history, past social history, past surgical history and problem list      Past Medical History:   Diagnosis Date    Allergic     High cholesterol        No past surgical history on file  Family History   Problem Relation Age of Onset    Heart disease Mother         cardiac disorder    Hypertension Mother     Heart attack Mother         prior     Heart disease Father 39        CABG    Asthma Brother          Medications have been verified  Objective   /79   Pulse 84   Temp 97 7 °F (36 5 °C)   Resp 18   Ht 5' 10" (1 778 m)   Wt 87 5 kg (193 lb)   SpO2 96%   BMI 27 69 kg/m²        Physical Exam     Physical Exam  Constitutional:       General: He is not in acute distress  Appearance: Normal appearance  He is normal weight  He is not ill-appearing  HENT:      Head: Normocephalic and atraumatic  Right Ear: Ear canal normal  A middle ear effusion is present  Tympanic membrane is not erythematous or bulging  Left Ear: Ear canal normal  A middle ear effusion is present  Tympanic membrane is not erythematous or bulging  Nose: Nose normal       Mouth/Throat:      Mouth: Mucous membranes are moist       Pharynx: Oropharynx is clear  Eyes:      Conjunctiva/sclera: Conjunctivae normal       Pupils: Pupils are equal, round, and reactive to light  Cardiovascular:      Rate and Rhythm: Normal rate and regular rhythm  Pulmonary:      Effort: Pulmonary effort is normal       Breath sounds: Normal breath sounds  Skin:     General: Skin is warm and dry  Neurological:      General: No focal deficit present  Mental Status: He is alert and oriented to person, place, and time  Cranial Nerves: No cranial nerve deficit  Sensory: No sensory deficit  Motor: No weakness        Coordination: Coordination normal       Gait: Gait normal

## 2021-04-08 ENCOUNTER — OFFICE VISIT (OUTPATIENT)
Dept: FAMILY MEDICINE CLINIC | Facility: CLINIC | Age: 41
End: 2021-04-08
Payer: COMMERCIAL

## 2021-04-08 VITALS
WEIGHT: 194.6 LBS | OXYGEN SATURATION: 96 % | DIASTOLIC BLOOD PRESSURE: 76 MMHG | HEART RATE: 69 BPM | BODY MASS INDEX: 27.86 KG/M2 | SYSTOLIC BLOOD PRESSURE: 124 MMHG | HEIGHT: 70 IN | TEMPERATURE: 97.6 F

## 2021-04-08 DIAGNOSIS — Z00.00 ANNUAL PHYSICAL EXAM: Primary | ICD-10-CM

## 2021-04-08 DIAGNOSIS — M62.08 DIASTASIS RECTI: ICD-10-CM

## 2021-04-08 DIAGNOSIS — Z23 ENCOUNTER FOR IMMUNIZATION: ICD-10-CM

## 2021-04-08 PROCEDURE — 99396 PREV VISIT EST AGE 40-64: CPT | Performed by: FAMILY MEDICINE

## 2021-04-08 PROCEDURE — 1036F TOBACCO NON-USER: CPT | Performed by: FAMILY MEDICINE

## 2021-04-08 PROCEDURE — 3725F SCREEN DEPRESSION PERFORMED: CPT | Performed by: FAMILY MEDICINE

## 2021-04-08 PROCEDURE — 3008F BODY MASS INDEX DOCD: CPT | Performed by: FAMILY MEDICINE

## 2021-04-08 NOTE — PROGRESS NOTES
[address]    NAME: Woody Lucas  AGE: 41 y o  SEX: male  : 1980     DATE: 2021     Assessment and Plan:     Problem List Items Addressed This Visit     None      Visit Diagnoses     Annual physical exam    -  Primary    normal exam    Diastasis recti        Reassuredd pt  Discussed core strengthening  Recheck prn          Immunizations and preventive care screenings were discussed with patient today  Appropriate education was printed on patient's after visit summary  Counseling:  · Exercise: the importance of regular exercise/physical activity was discussed  Recommend exercise 3-5 times per week for at least 30 minutes  · BMI Counseling: Body mass index is 27 92 kg/m²  The BMI is above normal  Nutrition recommendations include moderation in carbohydrate intake, increasing intake of lean protein, reducing intake of saturated fat and trans fat and reducing intake of cholesterol  Return in about 1 year (around 2022)  Chief Complaint:     Chief Complaint   Patient presents with    Physical Exam      History of Present Illness:     Adult Annual Physical   Patient here for a comprehensive physical exam  The patient reports problems - as below  - pt notes increased upper abd bulge when sitting up  No pain  Would like checked      Diet and Physical Activity  · Diet/Nutrition: well balanced diet  · Exercise: moderate cardiovascular exercise, 3-4 times a week on average and less than 30 minutes on average  Depression Screening  PHQ-9 Depression Screening    PHQ-9:   Frequency of the following problems over the past two weeks:      Little interest or pleasure in doing things: 0 - not at all  Feeling down, depressed, or hopeless: 0 - not at all  PHQ-2 Score: 0       General Health  · Sleep: sleeps well and gets 4-6 hours of sleep on average     · Hearing: normal - bilateral   · Vision: vision problems: mild macular degeneration on L    · Dental: regular dental visits and brushes teeth twice daily   Health  · Symptoms include: none     Review of Systems:     Review of Systems   Constitutional: Negative  HENT: Negative  Eyes: Negative  Respiratory: Negative  Cardiovascular: Negative  Gastrointestinal: Negative  Endocrine: Negative  Genitourinary: Negative  Musculoskeletal: Negative  Skin: Negative  Allergic/Immunologic: Negative  Neurological: Negative  Hematological: Negative  Psychiatric/Behavioral: Negative  Past Medical History:     Past Medical History:   Diagnosis Date    Allergic     High cholesterol       Past Surgical History:     History reviewed  No pertinent surgical history  Family History:     Family History   Problem Relation Age of Onset    Heart disease Mother         cardiac disorder    Hypertension Mother     Heart attack Mother         prior     Heart disease Father 39        CABG    Asthma Brother       Social History:        Social History     Socioeconomic History    Marital status: Single     Spouse name: None    Number of children: None    Years of education: None    Highest education level: None   Occupational History     Comment: full time employment    Social Needs    Financial resource strain: None    Food insecurity     Worry: None     Inability: None    Transportation needs     Medical: None     Non-medical: None   Tobacco Use    Smoking status: Never Smoker    Smokeless tobacco: Current User     Types: Chew   Substance and Sexual Activity    Alcohol use:  Yes     Alcohol/week: 3 0 standard drinks     Types: 3 Cans of beer per week     Comment: 3-4 beers/week    Drug use: No    Sexual activity: Yes     Partners: Female   Lifestyle    Physical activity     Days per week: None     Minutes per session: None    Stress: None   Relationships    Social connections     Talks on phone: None     Gets together: None Attends [employer] service: None     Active member of club or organization: None     Attends meetings of clubs or organizations: None     Relationship status: None    Intimate partner violence     Fear of current or ex partner: None     Emotionally abused: None     Physically abused: None     Forced sexual activity: None   Other Topics Concern    None   Social History Narrative    Always uses seat belt    Works as     2 cups coffee/day      Current Medications:     Current Outpatient Medications   Medication Sig Dispense Refill    atorvastatin (LIPITOR) 10 mg tablet TAKE 1 TABLET BY MOUTH EVERY DAY 90 tablet 0    cetirizine (ZyrTEC) 5 MG tablet Take 5 mg by mouth daily      Multiple Vitamins-Minerals (PRESERVISION AREDS PO) Take by mouth       No current facility-administered medications for this visit  Allergies: Allergies   Allergen Reactions    Bee Venom       Physical Exam:     /76   Pulse 69   Temp 97 6 °F (36 4 °C)   Ht 5' 10" (1 778 m)   Wt 88 3 kg (194 lb 9 6 oz)   SpO2 96%   BMI 27 92 kg/m²     Physical Exam  Vitals signs reviewed  Constitutional:       Appearance: He is well-developed  HENT:      Head: Normocephalic and atraumatic  Right Ear: Tympanic membrane, ear canal and external ear normal       Left Ear: Ear canal and external ear normal    Eyes:      Extraocular Movements: Extraocular movements intact  Conjunctiva/sclera: Conjunctivae normal       Pupils: Pupils are equal, round, and reactive to light  Neck:      Musculoskeletal: Normal range of motion and neck supple  No muscular tenderness  Thyroid: No thyromegaly  Vascular: No JVD  Cardiovascular:      Rate and Rhythm: Normal rate and regular rhythm  Pulses: Normal pulses  Heart sounds: Normal heart sounds  No murmur  Pulmonary:      Effort: Pulmonary effort is normal  No respiratory distress  Breath sounds: Normal breath sounds  No wheezing or rales  Abdominal:      General: Abdomen is flat  Bowel sounds are normal  There is no distension  Palpations: Abdomen is soft  There is no mass  Tenderness: There is no abdominal tenderness  Comments: Mild superior diastasis recti   Musculoskeletal: Normal range of motion  General: No swelling, tenderness or deformity  Right lower leg: No edema  Left lower leg: No edema  Lymphadenopathy:      Cervical: No cervical adenopathy  Skin:     General: Skin is warm  Capillary Refill: Capillary refill takes less than 2 seconds  Neurological:      General: No focal deficit present  Mental Status: He is alert and oriented to person, place, and time  Cranial Nerves: No cranial nerve deficit  Sensory: No sensory deficit  Motor: No weakness or abnormal muscle tone  Coordination: Coordination normal       Gait: Gait normal       Deep Tendon Reflexes: Reflexes normal    Psychiatric:         Mood and Affect: Mood normal          Behavior: Behavior normal          Thought Content:  Thought content normal          Judgment: Judgment normal       Comments: PHQ-9 Depression Screening    PHQ-9:   Frequency of the following problems over the past two weeks:      Little interest or pleasure in doing things: 0 - not at all  Feeling down, depressed, or hopeless: 0 - not at all  PHQ-2 Score: 0               MD Darin Sebastian

## 2021-04-08 NOTE — PATIENT INSTRUCTIONS

## 2021-04-30 ENCOUNTER — IMMUNIZATIONS (OUTPATIENT)
Dept: FAMILY MEDICINE CLINIC | Facility: HOSPITAL | Age: 41
End: 2021-04-30

## 2021-04-30 DIAGNOSIS — Z23 ENCOUNTER FOR IMMUNIZATION: Primary | ICD-10-CM

## 2021-04-30 PROCEDURE — 0001A SARS-COV-2 / COVID-19 MRNA VACCINE (PFIZER-BIONTECH) 30 MCG: CPT

## 2021-04-30 PROCEDURE — 91300 SARS-COV-2 / COVID-19 MRNA VACCINE (PFIZER-BIONTECH) 30 MCG: CPT

## 2021-05-10 DIAGNOSIS — E78.00 HYPERCHOLESTEROLEMIA: ICD-10-CM

## 2021-05-10 RX ORDER — ATORVASTATIN CALCIUM 10 MG/1
TABLET, FILM COATED ORAL
Qty: 90 TABLET | Refills: 1 | Status: SHIPPED | OUTPATIENT
Start: 2021-05-10 | End: 2021-12-28

## 2021-05-24 ENCOUNTER — IMMUNIZATIONS (OUTPATIENT)
Dept: FAMILY MEDICINE CLINIC | Facility: HOSPITAL | Age: 41
End: 2021-05-24

## 2021-05-24 DIAGNOSIS — Z23 ENCOUNTER FOR IMMUNIZATION: Primary | ICD-10-CM

## 2021-05-24 PROCEDURE — 91300 SARS-COV-2 / COVID-19 MRNA VACCINE (PFIZER-BIONTECH) 30 MCG: CPT

## 2021-05-24 PROCEDURE — 0002A SARS-COV-2 / COVID-19 MRNA VACCINE (PFIZER-BIONTECH) 30 MCG: CPT

## 2021-12-13 ENCOUNTER — OFFICE VISIT (OUTPATIENT)
Dept: FAMILY MEDICINE CLINIC | Facility: CLINIC | Age: 41
End: 2021-12-13
Payer: COMMERCIAL

## 2021-12-13 ENCOUNTER — TELEPHONE (OUTPATIENT)
Dept: FAMILY MEDICINE CLINIC | Facility: CLINIC | Age: 41
End: 2021-12-13

## 2021-12-13 VITALS
SYSTOLIC BLOOD PRESSURE: 122 MMHG | HEART RATE: 72 BPM | DIASTOLIC BLOOD PRESSURE: 80 MMHG | WEIGHT: 207 LBS | TEMPERATURE: 98 F | BODY MASS INDEX: 29.63 KG/M2 | HEIGHT: 70 IN

## 2021-12-13 DIAGNOSIS — K21.9 GASTROESOPHAGEAL REFLUX DISEASE, UNSPECIFIED WHETHER ESOPHAGITIS PRESENT: Primary | ICD-10-CM

## 2021-12-13 PROCEDURE — 1036F TOBACCO NON-USER: CPT | Performed by: FAMILY MEDICINE

## 2021-12-13 PROCEDURE — 3008F BODY MASS INDEX DOCD: CPT | Performed by: FAMILY MEDICINE

## 2021-12-13 PROCEDURE — 99214 OFFICE O/P EST MOD 30 MIN: CPT | Performed by: FAMILY MEDICINE

## 2021-12-13 RX ORDER — PANTOPRAZOLE SODIUM 40 MG/1
40 TABLET, DELAYED RELEASE ORAL
Qty: 30 TABLET | Refills: 0 | Status: SHIPPED | OUTPATIENT
Start: 2021-12-13 | End: 2022-01-04

## 2021-12-14 PROBLEM — K21.9 GASTROESOPHAGEAL REFLUX DISEASE: Status: ACTIVE | Noted: 2021-12-14

## 2021-12-22 ENCOUNTER — TELEPHONE (OUTPATIENT)
Dept: FAMILY MEDICINE CLINIC | Facility: CLINIC | Age: 41
End: 2021-12-22

## 2021-12-28 DIAGNOSIS — E78.00 HYPERCHOLESTEROLEMIA: ICD-10-CM

## 2021-12-28 RX ORDER — ATORVASTATIN CALCIUM 10 MG/1
TABLET, FILM COATED ORAL
Qty: 90 TABLET | Refills: 1 | Status: SHIPPED | OUTPATIENT
Start: 2021-12-28 | End: 2022-06-30

## 2022-01-04 DIAGNOSIS — K21.9 GASTROESOPHAGEAL REFLUX DISEASE, UNSPECIFIED WHETHER ESOPHAGITIS PRESENT: ICD-10-CM

## 2022-01-04 RX ORDER — PANTOPRAZOLE SODIUM 40 MG/1
TABLET, DELAYED RELEASE ORAL
Qty: 30 TABLET | Refills: 0 | Status: SHIPPED | OUTPATIENT
Start: 2022-01-04 | End: 2022-01-31

## 2022-01-05 ENCOUNTER — TELEPHONE (OUTPATIENT)
Dept: FAMILY MEDICINE CLINIC | Facility: CLINIC | Age: 42
End: 2022-01-05

## 2022-01-05 NOTE — TELEPHONE ENCOUNTER
Lenny Willard called stating he ate something yesterday that is not agreeing with his stomach  States he feels like the meds aren't helping him anymore  States you had mentioned before about seeing someone from gastro or having a test done?

## 2022-01-05 NOTE — TELEPHONE ENCOUNTER
Continue to protonix   I ordered a referral to GI - Dr John Ward  Please give patient the number to call

## 2022-01-18 ENCOUNTER — APPOINTMENT (OUTPATIENT)
Dept: LAB | Facility: MEDICAL CENTER | Age: 42
End: 2022-01-18
Payer: COMMERCIAL

## 2022-01-18 ENCOUNTER — OFFICE VISIT (OUTPATIENT)
Dept: GASTROENTEROLOGY | Facility: CLINIC | Age: 42
End: 2022-01-18
Payer: COMMERCIAL

## 2022-01-18 ENCOUNTER — TELEPHONE (OUTPATIENT)
Dept: GASTROENTEROLOGY | Facility: CLINIC | Age: 42
End: 2022-01-18

## 2022-01-18 VITALS
WEIGHT: 197 LBS | SYSTOLIC BLOOD PRESSURE: 138 MMHG | HEART RATE: 78 BPM | DIASTOLIC BLOOD PRESSURE: 78 MMHG | HEIGHT: 70 IN | BODY MASS INDEX: 28.2 KG/M2

## 2022-01-18 DIAGNOSIS — K21.9 GASTROESOPHAGEAL REFLUX DISEASE, UNSPECIFIED WHETHER ESOPHAGITIS PRESENT: Primary | ICD-10-CM

## 2022-01-18 DIAGNOSIS — R79.89 ELEVATED LFTS: ICD-10-CM

## 2022-01-18 DIAGNOSIS — Z11.59 ENCOUNTER FOR HEPATITIS C SCREENING TEST FOR LOW RISK PATIENT: ICD-10-CM

## 2022-01-18 LAB
ALBUMIN SERPL BCP-MCNC: 4.2 G/DL (ref 3.5–5)
ALP SERPL-CCNC: 53 U/L (ref 46–116)
ALT SERPL W P-5'-P-CCNC: 56 U/L (ref 12–78)
AST SERPL W P-5'-P-CCNC: 20 U/L (ref 5–45)
BILIRUB DIRECT SERPL-MCNC: 0.18 MG/DL (ref 0–0.2)
BILIRUB SERPL-MCNC: 0.96 MG/DL (ref 0.2–1)
HCV AB SER QL: NORMAL
PROT SERPL-MCNC: 7.9 G/DL (ref 6.4–8.2)

## 2022-01-18 PROCEDURE — 3008F BODY MASS INDEX DOCD: CPT | Performed by: NURSE PRACTITIONER

## 2022-01-18 PROCEDURE — 99203 OFFICE O/P NEW LOW 30 MIN: CPT | Performed by: NURSE PRACTITIONER

## 2022-01-18 PROCEDURE — 80076 HEPATIC FUNCTION PANEL: CPT

## 2022-01-18 PROCEDURE — 36415 COLL VENOUS BLD VENIPUNCTURE: CPT

## 2022-01-18 PROCEDURE — 1036F TOBACCO NON-USER: CPT | Performed by: NURSE PRACTITIONER

## 2022-01-18 PROCEDURE — 86803 HEPATITIS C AB TEST: CPT

## 2022-01-18 NOTE — PATIENT INSTRUCTIONS
GERD (Gastroesophageal Reflux Disease)   WHAT YOU NEED TO KNOW:   Gastroesophageal reflux disease (GERD) is reflux that occurs more than twice a week for a few weeks  Reflux means acid and food in the stomach back up into the esophagus  It usually causes heartburn and other symptoms  GERD can cause other health problems over time if it is not treated  DISCHARGE INSTRUCTIONS:   Call your local emergency number (911 in the [facility]) if:   · You have severe chest pain and sudden trouble breathing  Return to the emergency department if:   · You have trouble breathing after you vomit  · You have trouble swallowing, or pain with swallowing  · Your bowel movements are black, bloody, or tarry-looking  · Your vomit looks like coffee grounds or has blood in it  Call your doctor or gastroenterologist if:   · You feel full and cannot burp or vomit  · You vomit large amounts, or you vomit often  · You are losing weight without trying  · Your symptoms get worse or do not improve with treatment  · You have questions or concerns about your condition or care  Medicines:   · Medicines  are used to decrease stomach acid  Medicine may also be used to help your lower esophageal sphincter and stomach contract (tighten) more  · Take your medicine as directed  Contact your healthcare provider if you think your medicine is not helping or if you have side effects  Tell him of her if you are allergic to any medicine  Keep a list of the medicines, vitamins, and herbs you take  Include the amounts, and when and why you take them  Bring the list or the pill bottles to follow-up visits  Carry your medicine list with you in case of an emergency  Manage GERD:       · Do not have foods or drinks that may increase heartburn  These include chocolate, peppermint, fried or fatty foods, drinks that contain caffeine, or carbonated drinks (soda)   Other foods include spicy foods, onions, tomatoes, and tomato-based foods  Do not have foods or drinks that can irritate your esophagus, such as citrus fruits, juices, and alcohol  · Do not eat large meals  When you eat a lot of food at one time, your stomach needs more acid to digest it  Eat 6 small meals each day instead of 3 large ones, and eat slowly  Do not eat meals 2 to 3 hours before bedtime  · Elevate the head of your bed  Place 6-inch blocks under the head of your bed frame  You may also use more than one pillow under your head and shoulders while you sleep  · Maintain a healthy weight  If you are overweight, weight loss may help relieve symptoms of GERD  · Do not smoke  Smoking weakens the lower esophageal sphincter and increases the risk of GERD  Ask your healthcare provider for information if you currently smoke and need help to quit  E-cigarettes or smokeless tobacco still contain nicotine  Talk to your healthcare provider before you use these products  · Do not wear clothing that is tight around your waist   Tight clothing can put pressure on your stomach and cause or worsen GERD symptoms  Follow up with your doctor or gastroenterologist as directed:  Write down your questions so you remember to ask them during your visits  © Copyright [employer] 2021 Information is for End User's use only and may not be sold, redistributed or otherwise used for commercial purposes  All illustrations and images included in CareNotes® are the copyrighted property of A D A M , Inc  or Ty Rivera  The above information is an  only  It is not intended as medical advice for individual conditions or treatments  Talk to your doctor, nurse or pharmacist before following any medical regimen to see if it is safe and effective for you    Scheduled date of EGD(as of today): 1/21/22  Physician performing EGD: Dr Sisi Monzon  Location of EGD: [facility]  Instructions reviewed with patient by: Mara  Clearances: N/A

## 2022-01-18 NOTE — PROGRESS NOTES
[facility] - Outpatient Consultation  Moo Guardado 35 y o  male MRN: [id number]  Encounter: [id number]          ASSESSMENT AND PLAN:      1  Gastroesophageal reflux disease, unspecified whether esophagitis present  History of intermittent reflux since he was younger associated w/ chewing tobacco, but now with worseningreflux  and post prandial upper abdominal burning in LUQ/RUQ  He quit chewing tobacco in December and was started on PPI by his PCP 2 weeks before [facility]  Noticed worsening symptoms with spicy foods  Reflux resolved on PPI but continues with LUQ/RUQ burning after eating at times  Will increase Pantoprazole to 40mg daily and schedule EGD for further evaluation  Anti reflux diet and lifestyle changes reviewed  -     EGD; Future; Expected date: 01/18/2022    2  Elevated LFTs  3  Encounter for hepatitis C screening test for low risk patient  Elevated ALT on labs in April 2020, on recheck in May 2020 resolved  Will recheck hepatic function panel, if elevated consider RUQ US  Will check HCV Ab for screening  Patient denies frequent alcohol use  -     Hepatitis C antibody; Future        ______________________________________________________________________    HPI:  Moo Guardado is a 35 y o  male with history of chewed tobacco use and GERD presenting for evaluation of GERD, stomach burning  He just quit chewing tobacco in December, usually would have worsening reflux with that  His PCP started him on Pantoprazole 40 mg daily 2 weeks before [facility], he no longer has reflux however continues with burning in the LUQ and RUQ  Denies any dysphagia, odynophagia, nausea/vomiting, lower abdominal pain, diarrhea or constipation  He has an abdominal hernia as well when he does sit up he notices a bulge in his upper abdomen and this has been evaluated by his doctor in the past  He has not seen a surgeon for it  He doesn't use NSAIDs regularly  Social alcohol use  Denies any drug use   Grandfather and aunt with history of bladder cancer  He reports his father has a history of non-cancerous tumor removed from his stomach, also history of gastric ulcers  REVIEW OF SYSTEMS:    CONSTITUTIONAL: Denies any fever, chills, rigors, and weight loss  HEENT: No earache or tinnitus  CARDIOVASCULAR: No chest pain or palpitations  RESPIRATORY: Denies any cough, hemoptysis, shortness of breath or dyspnea on exertion  GASTROINTESTINAL: As noted in the History of Present Illness  GENITOURINARY: Denies any hematuria or dysuria  NEUROLOGIC: No dizziness or vertigo  MUSCULOSKELETAL: Denies any joint swellings  SKIN: Denies skin rashes or itching  ENDOCRINE: Denies excessive thirst  Denies intolerance to heat or cold  PSYCHOSOCIAL: Denies depression or anxiety  Denies any recent memory loss  Historical Information   Past Medical History:   Diagnosis Date    Allergic     High cholesterol      History reviewed  No pertinent surgical history    Social History   Social History     Substance and Sexual Activity   Alcohol Use Yes    Alcohol/week: 3 0 standard drinks    Types: 3 Cans of beer per week    Comment: 3-4 beers/week     Social History     Substance and Sexual Activity   Drug Use No     Social History     Tobacco Use   Smoking Status Never Smoker   Smokeless Tobacco Current User    Types: Chew     Family History   Problem Relation Age of Onset    Heart disease Mother         cardiac disorder    Hypertension Mother     Heart attack Mother         prior     Heart disease Father 39        CABG    Asthma Brother        Meds/Allergies       Current Outpatient Medications:     atorvastatin (LIPITOR) 10 mg tablet    cetirizine (ZyrTEC) 5 MG tablet    Multiple Vitamins-Minerals (PRESERVISION AREDS PO)    pantoprazole (PROTONIX) 40 mg tablet    Allergies   Allergen Reactions    Bee Venom            Objective     Blood pressure 138/78, pulse 78, height 5' 10" (1 778 m), weight 89 4 kg (197 lb)  Body mass index is 28 27 kg/m²  PHYSICAL EXAM:      General Appearance:   Alert, cooperative, no distress   HEENT:   Normocephalic, atraumatic, anicteric  Neck:  Supple, symmetrical, trachea midline   Lungs:   Clear to auscultation bilaterally; no rales, rhonchi or wheezing; respirations unlabored    Heart[de-identified]   Regular rate and rhythm; no murmur  Abdomen:   Soft, non-tender, non-distended; normal bowel sounds; no masses, no organomegaly    Genitalia:   Deferred    Rectal:   Deferred    Extremities:  No cyanosis, clubbing or edema    Skin:  No jaundice, rashes, or lesions    Lymph nodes:  No palpable cervical lymphadenopathy        Lab Results:   No visits with results within 1 Day(s) from this visit  Latest known visit with results is:   Appointment on 05/22/2020   Component Date Value    Sodium 05/22/2020 137     Potassium 05/22/2020 4 7     Chloride 05/22/2020 107     CO2 05/22/2020 29     ANION GAP 05/22/2020 1*    BUN 05/22/2020 14     Creatinine 05/22/2020 0 93     Glucose, Fasting 05/22/2020 87     Calcium 05/22/2020 9 8     AST 05/22/2020 36     ALT 05/22/2020 69     Alkaline Phosphatase 05/22/2020 54     Total Protein 05/22/2020 7 9     Albumin 05/22/2020 4 4     Total Bilirubin 05/22/2020 0 63     eGFR 05/22/2020 102          Radiology Results:   No results found

## 2022-01-18 NOTE — H&P (VIEW-ONLY)
[facility] - Outpatient Consultation  Priscila Bolivar 35 y o  male MRN: [id number]  Encounter: [id number]          ASSESSMENT AND PLAN:      1  Gastroesophageal reflux disease, unspecified whether esophagitis present  History of intermittent reflux since he was younger associated w/ chewing tobacco, but now with worseningreflux  and post prandial upper abdominal burning in LUQ/RUQ  He quit chewing tobacco in December and was started on PPI by his PCP 2 weeks before [facility]  Noticed worsening symptoms with spicy foods  Reflux resolved on PPI but continues with LUQ/RUQ burning after eating at times  Will increase Pantoprazole to 40mg daily and schedule EGD for further evaluation  Anti reflux diet and lifestyle changes reviewed  -     EGD; Future; Expected date: 01/18/2022    2  Elevated LFTs  3  Encounter for hepatitis C screening test for low risk patient  Elevated ALT on labs in April 2020, on recheck in May 2020 resolved  Will recheck hepatic function panel, if elevated consider RUQ US  Will check HCV Ab for screening  Patient denies frequent alcohol use  -     Hepatitis C antibody; Future        ______________________________________________________________________    HPI:  Priscila Bolivar is a 35 y o  male with history of chewed tobacco use and GERD presenting for evaluation of GERD, stomach burning  He just quit chewing tobacco in December, usually would have worsening reflux with that  His PCP started him on Pantoprazole 40 mg daily 2 weeks before [facility], he no longer has reflux however continues with burning in the LUQ and RUQ  Denies any dysphagia, odynophagia, nausea/vomiting, lower abdominal pain, diarrhea or constipation  He has an abdominal hernia as well when he does sit up he notices a bulge in his upper abdomen and this has been evaluated by his doctor in the past  He has not seen a surgeon for it  He doesn't use NSAIDs regularly  Social alcohol use  Denies any drug use   Grandfather and aunt with history of bladder cancer  He reports his father has a history of non-cancerous tumor removed from his stomach, also history of gastric ulcers  REVIEW OF SYSTEMS:    CONSTITUTIONAL: Denies any fever, chills, rigors, and weight loss  HEENT: No earache or tinnitus  CARDIOVASCULAR: No chest pain or palpitations  RESPIRATORY: Denies any cough, hemoptysis, shortness of breath or dyspnea on exertion  GASTROINTESTINAL: As noted in the History of Present Illness  GENITOURINARY: Denies any hematuria or dysuria  NEUROLOGIC: No dizziness or vertigo  MUSCULOSKELETAL: Denies any joint swellings  SKIN: Denies skin rashes or itching  ENDOCRINE: Denies excessive thirst  Denies intolerance to heat or cold  PSYCHOSOCIAL: Denies depression or anxiety  Denies any recent memory loss  Historical Information   Past Medical History:   Diagnosis Date    Allergic     High cholesterol      History reviewed  No pertinent surgical history    Social History   Social History     Substance and Sexual Activity   Alcohol Use Yes    Alcohol/week: 3 0 standard drinks    Types: 3 Cans of beer per week    Comment: 3-4 beers/week     Social History     Substance and Sexual Activity   Drug Use No     Social History     Tobacco Use   Smoking Status Never Smoker   Smokeless Tobacco Current User    Types: Chew     Family History   Problem Relation Age of Onset    Heart disease Mother         cardiac disorder    Hypertension Mother     Heart attack Mother         prior     Heart disease Father 39        CABG    Asthma Brother        Meds/Allergies       Current Outpatient Medications:     atorvastatin (LIPITOR) 10 mg tablet    cetirizine (ZyrTEC) 5 MG tablet    Multiple Vitamins-Minerals (PRESERVISION AREDS PO)    pantoprazole (PROTONIX) 40 mg tablet    Allergies   Allergen Reactions    Bee Venom            Objective     Blood pressure 138/78, pulse 78, height 5' 10" (1 778 m), weight 89 4 kg (197 lb)  Body mass index is 28 27 kg/m²  PHYSICAL EXAM:      General Appearance:   Alert, cooperative, no distress   HEENT:   Normocephalic, atraumatic, anicteric  Neck:  Supple, symmetrical, trachea midline   Lungs:   Clear to auscultation bilaterally; no rales, rhonchi or wheezing; respirations unlabored    Heart[de-identified]   Regular rate and rhythm; no murmur  Abdomen:   Soft, non-tender, non-distended; normal bowel sounds; no masses, no organomegaly    Genitalia:   Deferred    Rectal:   Deferred    Extremities:  No cyanosis, clubbing or edema    Skin:  No jaundice, rashes, or lesions    Lymph nodes:  No palpable cervical lymphadenopathy        Lab Results:   No visits with results within 1 Day(s) from this visit  Latest known visit with results is:   Appointment on 05/22/2020   Component Date Value    Sodium 05/22/2020 137     Potassium 05/22/2020 4 7     Chloride 05/22/2020 107     CO2 05/22/2020 29     ANION GAP 05/22/2020 1*    BUN 05/22/2020 14     Creatinine 05/22/2020 0 93     Glucose, Fasting 05/22/2020 87     Calcium 05/22/2020 9 8     AST 05/22/2020 36     ALT 05/22/2020 69     Alkaline Phosphatase 05/22/2020 54     Total Protein 05/22/2020 7 9     Albumin 05/22/2020 4 4     Total Bilirubin 05/22/2020 0 63     eGFR 05/22/2020 102          Radiology Results:   No results found

## 2022-01-31 DIAGNOSIS — K21.9 GASTROESOPHAGEAL REFLUX DISEASE, UNSPECIFIED WHETHER ESOPHAGITIS PRESENT: ICD-10-CM

## 2022-01-31 RX ORDER — PANTOPRAZOLE SODIUM 40 MG/1
TABLET, DELAYED RELEASE ORAL
Qty: 30 TABLET | Refills: 0 | Status: SHIPPED | OUTPATIENT
Start: 2022-01-31 | End: 2022-03-03

## 2022-02-07 ENCOUNTER — ANESTHESIA EVENT (OUTPATIENT)
Dept: GASTROENTEROLOGY | Facility: AMBULATORY SURGERY CENTER | Age: 42
End: 2022-02-07

## 2022-02-07 ENCOUNTER — HOSPITAL ENCOUNTER (OUTPATIENT)
Dept: GASTROENTEROLOGY | Facility: AMBULATORY SURGERY CENTER | Age: 42
Discharge: HOME | End: 2022-02-07
Payer: COMMERCIAL

## 2022-02-07 ENCOUNTER — ANESTHESIA (OUTPATIENT)
Dept: GASTROENTEROLOGY | Facility: AMBULATORY SURGERY CENTER | Age: 42
End: 2022-02-07

## 2022-02-07 VITALS
HEIGHT: 70 IN | RESPIRATION RATE: 18 BRPM | SYSTOLIC BLOOD PRESSURE: 110 MMHG | OXYGEN SATURATION: 94 % | HEART RATE: 92 BPM | BODY MASS INDEX: 28.63 KG/M2 | TEMPERATURE: 97.2 F | WEIGHT: 200 LBS | DIASTOLIC BLOOD PRESSURE: 70 MMHG

## 2022-02-07 DIAGNOSIS — K21.9 GASTROESOPHAGEAL REFLUX DISEASE, UNSPECIFIED WHETHER ESOPHAGITIS PRESENT: ICD-10-CM

## 2022-02-07 PROCEDURE — 88305 TISSUE EXAM BY PATHOLOGIST: CPT | Performed by: PATHOLOGY

## 2022-02-07 PROCEDURE — 43239 EGD BIOPSY SINGLE/MULTIPLE: CPT | Performed by: INTERNAL MEDICINE

## 2022-02-07 PROCEDURE — 00731 ANES UPR GI NDSC PX NOS: CPT | Performed by: NURSE ANESTHETIST, CERTIFIED REGISTERED

## 2022-02-07 RX ORDER — PROPOFOL 10 MG/ML
INJECTION, EMULSION INTRAVENOUS AS NEEDED
Status: DISCONTINUED | OUTPATIENT
Start: 2022-02-07 | End: 2022-02-07

## 2022-02-07 RX ORDER — SODIUM CHLORIDE 9 MG/ML
30 INJECTION, SOLUTION INTRAVENOUS CONTINUOUS
Status: DISCONTINUED | OUTPATIENT
Start: 2022-02-07 | End: 2022-02-11 | Stop reason: HOSPADM

## 2022-02-07 RX ORDER — LIDOCAINE HYDROCHLORIDE 10 MG/ML
INJECTION, SOLUTION EPIDURAL; INFILTRATION; INTRACAUDAL; PERINEURAL AS NEEDED
Status: DISCONTINUED | OUTPATIENT
Start: 2022-02-07 | End: 2022-02-07

## 2022-02-07 RX ADMIN — PROPOFOL 100 MG: 10 INJECTION, EMULSION INTRAVENOUS at 12:27

## 2022-02-07 RX ADMIN — PROPOFOL 200 MG: 10 INJECTION, EMULSION INTRAVENOUS at 12:21

## 2022-02-07 RX ADMIN — PROPOFOL 200 MG: 10 INJECTION, EMULSION INTRAVENOUS at 12:26

## 2022-02-07 RX ADMIN — SODIUM CHLORIDE: 9 INJECTION, SOLUTION INTRAVENOUS at 12:17

## 2022-02-07 RX ADMIN — PROPOFOL 100 MG: 10 INJECTION, EMULSION INTRAVENOUS at 12:24

## 2022-02-07 RX ADMIN — LIDOCAINE HYDROCHLORIDE 80 MG: 10 INJECTION, SOLUTION EPIDURAL; INFILTRATION; INTRACAUDAL; PERINEURAL at 12:21

## 2022-02-07 RX ADMIN — PROPOFOL 50 MG: 10 INJECTION, EMULSION INTRAVENOUS at 12:22

## 2022-02-07 NOTE — ANESTHESIA PREPROCEDURE EVALUATION
Procedure:  EGD    Relevant Problems   ANESTHESIA (within normal limits)      CARDIO   (+) Essential hypertriglyceridemia   (+) Hypercholesterolemia      GI/HEPATIC   (+) Gastroesophageal reflux disease        Physical Exam    Airway    Mallampati score: II  TM Distance: >3 FB  Neck ROM: full     Dental   No notable dental hx     Cardiovascular  Rhythm: regular, Rate: normal, Cardiovascular exam normal    Pulmonary  Pulmonary exam normal Breath sounds clear to auscultation,     Other Findings        Anesthesia Plan  ASA Score- 1     Anesthesia Type- IV sedation with anesthesia with ASA Monitors  Additional Monitors:   Airway Plan:     Comment: Nasal cannula  Plan Factors-Exercise tolerance (METS): >4 METS  Chart reviewed  EKG reviewed  Imaging results reviewed  Existing labs reviewed  Patient summary reviewed  Patient is not a current smoker  Patient not instructed to abstain from smoking on day of procedure  Patient did not smoke on day of surgery  Obstructive sleep apnea risk education given perioperatively  Induction- intravenous  Postoperative Plan-     Informed Consent- Anesthetic plan and risks discussed with patient  I personally reviewed this patient with the CRNA  Discussed and agreed on the Anesthesia Plan with the CRNA  Corey Simpson

## 2022-02-07 NOTE — INTERVAL H&P NOTE
H&P reviewed  After examining the patient I find no changes in the patients condition since the H&P had been written      Vitals:    02/07/22 1153   BP: 137/82   Pulse: 68   Resp: 18   Temp: (!) 97 2 °F (36 2 °C)   SpO2: 99%

## 2022-02-07 NOTE — DISCHARGE INSTRUCTIONS
Upper Endoscopy   WHAT YOU NEED TO KNOW:   An upper endoscopy is also called an upper gastrointestinal (GI) endoscopy, or an esophagogastroduodenoscopy (EGD)  It is a procedure to examine the inside of your esophagus, stomach, and duodenum (first part of the small intestine) with a scope  You may feel bloated, gassy, or have some abdominal discomfort after your procedure  Your throat may be sore for 24 to 36 hours  You may burp or pass gas from air that is still inside your body  DISCHARGE INSTRUCTIONS:   Seek care immediately if:    You have sudden, severe abdominal pain   You have problems swallowing   You have a large amount of black, sticky bowel movements or blood in your bowel movements   You have sudden trouble breathing   You feel weak, lightheaded, or faint or your heart beats faster than normal for you  Contact your healthcare provider if:    You have a fever and chills   You have nausea or are vomiting   Your abdomen is bloated or feels full and hard   You have abdominal pain   You have black, sticky bowel movements or blood in your bowel movements   You have not had a bowel movement for 3 days after your procedure   You have rash or hives   You have questions or concerns about your procedure  Activity:    Do not lift, strain, or run for 24 hours after your procedure   Rest after your procedure  You have been given medicine to relax you  Do not drive or make important decisions until the day after your procedure  Return to your normal activity as directed   Relieve gas and discomfort from bloating by lying on your right side with a heating pad on your abdomen  You may need to take short walks to help the gas move out  Eat small meals until bloating is relieved  Follow up with your healthcare provider as directed: Write down your questions so you remember to ask them during your visits       If you take a blood thinner, please review the specific instructions from your endoscopist about when you should resume it  These can be found in the Recommendation and Your Medication list sections of this After Visit Summary  GERD (Gastroesophageal Reflux Disease)   AMBULATORY CARE:   Gastroesophageal reflux disease (GERD)  is reflux that occurs more than twice a week for a few weeks  Reflux means acid and food in the stomach back up into the esophagus  It usually causes heartburn and other symptoms  GERD can cause other health problems over time if it is not treated  Signs and symptoms:   · Heartburn (burning pain in your chest)    · Pain after meals that spreads to your neck, jaw, or shoulder    · Pain that gets better when you change positions    · Bitter or acid taste in your mouth    · A dry cough    · Trouble swallowing or pain with swallowing    · Hoarseness or a sore throat    · Burping or hiccups    · Feeling full soon after you start eating    Call your local emergency number (911 in the [address] [facility]) if:   · You have severe chest pain and sudden trouble breathing  Seek care immediately if:   · You have trouble breathing after you vomit  · You have trouble swallowing, or pain with swallowing  · Your bowel movements are black, bloody, or tarry-looking  · Your vomit looks like coffee grounds or has blood in it  Call your doctor or gastroenterologist if:   · You feel full and cannot burp or vomit  · You vomit large amounts, or you vomit often  · You are losing weight without trying  · Your symptoms get worse or do not improve with treatment  · You have questions or concerns about your condition or care  Treatment for GERD:   · Medicines  are used to decrease stomach acid  Medicine may also be used to help your lower esophageal sphincter and stomach contract (tighten) more  · Surgery  is done to wrap the upper part of the stomach around the esophageal sphincter   This will strengthen the sphincter and prevent reflux  Manage GERD:       · Do not have foods or drinks that may increase heartburn  These include chocolate, peppermint, fried or fatty foods, drinks that contain caffeine, or carbonated drinks (soda)  Other foods include spicy foods, onions, tomatoes, and tomato-based foods  Do not have foods or drinks that can irritate your esophagus, such as citrus fruits, juices, and alcohol  · Do not eat large meals  When you eat a lot of food at one time, your stomach needs more acid to digest it  Eat 6 small meals each day instead of 3 large meals, and eat slowly  Do not eat meals 2 to 3 hours before bedtime  · Elevate the head of your bed  Place 6-inch blocks under the head of your bed frame  You may also use more than one pillow under your head and shoulders while you sleep  · Maintain a healthy weight  If you are overweight, weight loss may help relieve symptoms of GERD  · Do not smoke  Smoking weakens the lower esophageal sphincter and increases the risk of GERD  Ask your healthcare provider for information if you currently smoke and need help to quit  E-cigarettes or smokeless tobacco still contain nicotine  Talk to your healthcare provider before you use these products  · Do not wear clothing that is tight around your waist   Tight clothing can put pressure on your stomach and cause or worsen GERD symptoms  Follow up with your doctor or gastroenterologist as directed:  Write down your questions so you remember to ask them during your visits  © Copyright [employer] 2021 Information is for End User's use only and may not be sold, redistributed or otherwise used for commercial purposes  All illustrations and images included in CareNotes® are the copyrighted property of [employer]  or Ty Navarro   The above information is an  only  It is not intended as medical advice for individual conditions or treatments   Talk to your doctor, nurse or pharmacist before following any medical regimen to see if it is safe and effective for you  Esophagitis   WHAT YOU NEED TO KNOW:   Esophagitis is inflammation or irritation of the lining of the esophagus  DISCHARGE INSTRUCTIONS:   Call your local emergency number (911 in the [address] [facility]) for any of the following:   · You have any of the following signs of a heart attack:      ? Squeezing, pressure, or pain in your chest    ? You may  also have any of the following:     § Discomfort or pain in your back, neck, jaw, stomach, or arm    § Shortness of breath    § Nausea or vomiting    § Lightheadedness or a sudden cold sweat      Return to the emergency department if:   · You feel like you have food stuck in your throat and you cannot cough it out  Call your doctor if:   · You have new or worsening symptoms, even after treatment  · You have questions or concerns about your condition or care  Medicines:   · Medicines  may be given to fight an infection or to control stomach acid  · Take your medicine as directed  Contact your healthcare provider if you think your medicine is not helping or if you have side effects  Tell him of her if you are allergic to any medicine  Keep a list of the medicines, vitamins, and herbs you take  Include the amounts, and when and why you take them  Bring the list or the pill bottles to follow-up visits  Carry your medicine list with you in case of an emergency  Manage or prevent esophagitis:   · Do not smoke  Nicotine and other chemicals in cigarettes and cigars can irritate and damage your esophagus  Ask your healthcare provider for information if you currently smoke and need help to quit  E-cigarettes or smokeless tobacco still contain nicotine  Talk to your healthcare provider before you use these products  · Do not drink alcohol  Alcohol can irritate your esophagus  Talk to your healthcare provider if you need help to stop drinking      · Limit or do not eat foods that can lead to esophagitis  Foods such as oranges and salsa can irritate your esophagus  Caffeine and chocolate can cause acid reflux  High-fat and fried foods make your stomach digest food more slowly  This increases the amount of stomach acid your esophagus is exposed to  Eat small meals, and drink water with your meals  Soft foods such as yogurt and applesauce may help soothe your throat  Do not eat for at least 3 hours before you go to bed  · Drink more liquid when you take pills  Drink a full glass of water when you take your pills  Ask your healthcare provider if you can take your pills at least an hour before you go to bed  · Prevent acid reflux  Do not bend over unless it is necessary  Acid may back up into your esophagus when you bend over  If possible, keep the head of your bed elevated while you sleep  This will help keep acid from backing up  Manage stress  Stress can make your symptoms worse or cause stomach acid to back up  · Keep batteries and similar objects out of the reach of children  Babies often put items in their mouths to explore them  Button batteries are easy to swallow and can cause serious damage  Keep the battery covers of electronic devices such as remote controls taped closed  Store all batteries and toxic materials where children cannot get to them  Use childproof locks to keep children away from dangerous materials  Follow up with your doctor as directed: Your doctor may refer you to a stomach specialist, allergist, or dietitian  You may need ongoing tests or treatment  Write down your questions so you remember to ask them during your visits  © [employer] 2021 Information is for End User's use only and may not be sold, redistributed or otherwise used for commercial purposes  All illustrations and images included in CareNotes® are the copyrighted property of [employer]  or Ty Rivera  The above information is an  only   It is not intended as medical advice for individual conditions or treatments  Talk to your doctor, nurse or pharmacist before following any medical regimen to see if it is safe and effective for you

## 2022-03-03 DIAGNOSIS — K21.9 GASTROESOPHAGEAL REFLUX DISEASE, UNSPECIFIED WHETHER ESOPHAGITIS PRESENT: ICD-10-CM

## 2022-03-03 RX ORDER — PANTOPRAZOLE SODIUM 40 MG/1
TABLET, DELAYED RELEASE ORAL
Qty: 30 TABLET | Refills: 0 | Status: SHIPPED | OUTPATIENT
Start: 2022-03-03 | End: 2022-04-01

## 2022-03-04 ENCOUNTER — OFFICE VISIT (OUTPATIENT)
Dept: GASTROENTEROLOGY | Facility: CLINIC | Age: 42
End: 2022-03-04
Payer: COMMERCIAL

## 2022-03-04 VITALS
BODY MASS INDEX: 28.63 KG/M2 | HEIGHT: 70 IN | HEART RATE: 86 BPM | WEIGHT: 200 LBS | DIASTOLIC BLOOD PRESSURE: 90 MMHG | SYSTOLIC BLOOD PRESSURE: 141 MMHG

## 2022-03-04 DIAGNOSIS — R10.11 RIGHT UPPER QUADRANT ABDOMINAL PAIN: Primary | ICD-10-CM

## 2022-03-04 DIAGNOSIS — R07.81 ANTERIOR PLEURITIC PAIN: ICD-10-CM

## 2022-03-04 PROCEDURE — 99214 OFFICE O/P EST MOD 30 MIN: CPT | Performed by: INTERNAL MEDICINE

## 2022-03-04 PROCEDURE — 3008F BODY MASS INDEX DOCD: CPT | Performed by: INTERNAL MEDICINE

## 2022-03-04 PROCEDURE — 1036F TOBACCO NON-USER: CPT | Performed by: INTERNAL MEDICINE

## 2022-03-04 NOTE — PROGRESS NOTES
[facility] - Outpatient Follow-up Note  Radha Elmore 35 y o  male MRN: [id number]  Encounter: [id number]          ASSESSMENT AND PLAN:      1  Right upper quadrant abdominal pain  Patient recently had upper endoscopy for evaluation of epigastric pain and reflux  Biopsies taken of the lower esophagus for evaluation of Graham's esophagus was negative  He was explained regarding the biopsy findings  There was no intestinal metaplasia to suggest Graham's esophagus  Currently he is taking pantoprazole 40 mg a day  Patient had concerns regarding long-term usage  I have explained to him regarding side effects of the medication  Will try to reduce the dose of PPI to 20 mg next visit  He will continue reflux precautions  He is a  and works night shift  Dietary habit is erratic for that reason     - CT abdomen pelvis w contrast; Future    2  Anterior pleuritic pain  Patient has lower chest wall pain  This appears to be pleuritic in nature  The pain does worsen on taking a deep breath  There is also pain in the right upper quadrant of abdomen  Will order a CT scan of the abdomen for evaluation of this pain  He will see me again in 6-8 weeks  - CT abdomen pelvis w contrast; Future    ______________________________________________________________________    SUBJECTIVE:  Right upper quadrant pain  Pleuritic right lower chest pain  Gastroesophageal reflux disease  Heartburn is well controlled now after taking medication  REVIEW OF SYSTEMS IS OTHERWISE NEGATIVE  Historical Information   Past Medical History:   Diagnosis Date    Allergic     GERD (gastroesophageal reflux disease)     High cholesterol     Seasonal allergies     feels well today regarding allergies 2/7/22     History reviewed  No pertinent surgical history    Social History   Social History     Substance and Sexual Activity   Alcohol Use Yes    Comment: seldom-beer     Social History Substance and Sexual Activity   Drug Use No     Social History     Tobacco Use   Smoking Status Never Smoker   Smokeless Tobacco Former User    Types: Chew     Family History   Problem Relation Age of Onset    Heart disease Mother         cardiac disorder    Hypertension Mother     Heart attack Mother         prior     Heart disease Father 39        CABG    Asthma Brother        Meds/Allergies       Current Outpatient Medications:     atorvastatin (LIPITOR) 10 mg tablet    cetirizine (ZyrTEC) 5 MG tablet    Multiple Vitamins-Minerals (PRESERVISION AREDS PO)    pantoprazole (PROTONIX) 40 mg tablet    Allergies   Allergen Reactions    Bee Venom Swelling     At sting site           Objective     Blood pressure 141/90, pulse 86, height 5' 10" (1 778 m), weight 90 7 kg (200 lb)  Body mass index is 28 7 kg/m²  PHYSICAL EXAM:      General Appearance:   Alert, cooperative, no distress   HEENT:   Normocephalic, atraumatic, anicteric      Neck:  Supple, symmetrical, trachea midline   Lungs:   Clear to auscultation bilaterally; no rales, rhonchi or wheezing; respirations unlabored   There is some pleuritic chest pain  There is also some tenderness in the subcostal area   Heart[de-identified]   Regular rate and rhythm; no murmur, rub, or gallop  Abdomen:   Soft, non-tender, non-distended; normal bowel sounds; no masses, no organomegaly    Genitalia:   Deferred    Rectal:   Deferred    Extremities:  No cyanosis, clubbing or edema    Pulses:  2+ and symmetric    Skin:  No jaundice, rashes, or lesions    Lymph nodes:  No palpable cervical lymphadenopathy        Lab Results:   No visits with results within 1 Day(s) from this visit     Latest known visit with results is:   Hospital Outpatient Visit on 02/07/2022   Component Date Value    Case Report 02/07/2022                      Value:Surgical Pathology Report                         Case: [id number]                                   Authorizing Provider:  Camila Hernandez MD Collected:           02/07/2022 1225              Ordering Location:     [address] [place] [address] Received:            02/07/2022 2031                                     [place]                                                                  Pathologist:           Nilson Guzman MD                                                                 Specimens:   A) - Stomach, ANTRUM   cold bx   erythema, check for Hpy                                            B) - Esophagus, LOWER ESOPH   cold bx   erythema                                           Final Diagnosis 02/07/2022                      Value: This result contains rich text formatting which cannot be displayed here   Note 02/07/2022                      Value: This result contains rich text formatting which cannot be displayed here   Additional Information 02/07/2022                      Value: This result contains rich text formatting which cannot be displayed here  Justen Lemme Gross Description 02/07/2022                      Value: This result contains rich text formatting which cannot be displayed here  Radiology Results:   EGD    Result Date: 2/7/2022  Narrative: [facility] [address] [place] [address] [phone] DATE OF SERVICE: 2/07/22 PHYSICIAN(S): Oleksandr Meehan MD Proceduralist INDICATION: Gastroesophageal reflux disease, unspecified whether esophagitis present POST-OP DIAGNOSIS: See the impression below  PREPROCEDURE: Informed consent was obtained for the procedure, including sedation  Risks of perforation, hemorrhage, adverse drug reaction and aspiration were discussed  The patient was placed in the left lateral decubitus position  Patient was explained about the risks and benefits of the procedure  Risks including but not limited to bleeding, infection, and perforation were explained in detail  Also explained about less than 100% sensitivity with the exam and other alternatives  DETAILS OF PROCEDURE: Patient was taken to the procedure room where a time out was performed to confirm correct patient and correct procedure  The patient underwent monitored anesthesia care, which was administered by an anesthesia professional  The patient's blood pressure, heart rate, level of consciousness, respirations and oxygen were monitored throughout the procedure  The scope was advanced to the second part of the duodenum  Retroflexion was performed in the fundus  The patient experienced no blood loss  The procedure was not difficult  The patient tolerated the procedure well  There were no apparent complications  ANESTHESIA INFORMATION: ASA: I Anesthesia Type: IV Sedation with Anesthesia MEDICATIONS: No administrations occurring from 1217 to 1231 on 02/07/22 FINDINGS: Erythematous mucosa in the antrum; performed cold forceps biopsy Irregular Z-line 40 cm from the incisors; performed cold forceps biopsy SPECIMENS: ID Type Source Tests Collected by Time Destination 1 : ANTRUM   cold bx   erythema, check for Hpy Tissue Stomach TISSUE EXAM Mónica Jackson MD 2/7/2022 12:25 PM  2 : LOWER ESOPH   cold bx   erythema Tissue Esophagus TISSUE EXAM Mónica Jackson MD 2/7/2022 12:28 PM      Impression: Mild erythema distal esophagus and gastric antrum  RECOMMENDATION: Await pathology results, anti reflux measures reviewed, diet discussed    Follow-up in my office as needed   Mónica Jackson MD

## 2022-04-01 DIAGNOSIS — K21.9 GASTROESOPHAGEAL REFLUX DISEASE, UNSPECIFIED WHETHER ESOPHAGITIS PRESENT: ICD-10-CM

## 2022-04-01 RX ORDER — PANTOPRAZOLE SODIUM 40 MG/1
TABLET, DELAYED RELEASE ORAL
Qty: 30 TABLET | Refills: 0 | Status: SHIPPED | OUTPATIENT
Start: 2022-04-01 | End: 2022-04-11

## 2022-04-11 ENCOUNTER — OFFICE VISIT (OUTPATIENT)
Dept: FAMILY MEDICINE CLINIC | Facility: CLINIC | Age: 42
End: 2022-04-11
Payer: COMMERCIAL

## 2022-04-11 VITALS
DIASTOLIC BLOOD PRESSURE: 82 MMHG | HEIGHT: 70 IN | BODY MASS INDEX: 29.35 KG/M2 | HEART RATE: 76 BPM | TEMPERATURE: 98.5 F | SYSTOLIC BLOOD PRESSURE: 122 MMHG | WEIGHT: 205 LBS

## 2022-04-11 DIAGNOSIS — E78.00 HYPERCHOLESTEROLEMIA: ICD-10-CM

## 2022-04-11 DIAGNOSIS — Z00.00 ANNUAL PHYSICAL EXAM: Primary | ICD-10-CM

## 2022-04-11 DIAGNOSIS — K62.5 RECTAL BLEEDING: ICD-10-CM

## 2022-04-11 DIAGNOSIS — K21.9 GASTROESOPHAGEAL REFLUX DISEASE, UNSPECIFIED WHETHER ESOPHAGITIS PRESENT: ICD-10-CM

## 2022-04-11 PROCEDURE — 3725F SCREEN DEPRESSION PERFORMED: CPT | Performed by: FAMILY MEDICINE

## 2022-04-11 PROCEDURE — 1036F TOBACCO NON-USER: CPT | Performed by: FAMILY MEDICINE

## 2022-04-11 PROCEDURE — 99396 PREV VISIT EST AGE 40-64: CPT | Performed by: FAMILY MEDICINE

## 2022-04-11 PROCEDURE — 3008F BODY MASS INDEX DOCD: CPT | Performed by: FAMILY MEDICINE

## 2022-04-11 RX ORDER — PANTOPRAZOLE SODIUM 20 MG/1
20 TABLET, DELAYED RELEASE ORAL
Qty: 30 TABLET | Refills: 5
Start: 2022-04-11

## 2022-04-11 NOTE — PROGRESS NOTES
[address]    NAME: Gideon Essex  AGE: 35 y o  SEX: male  : 1980     DATE: 2022     Assessment and Plan:     Problem List Items Addressed This Visit        Digestive    Gastroesophageal reflux disease     Doing well on lower dose of pantoprazole  Continue 20mg qd  Recheck 1y or prn         Relevant Medications    pantoprazole (PROTONIX) 20 mg tablet    Rectal bleeding     Bright red - ?associated with constipation? Will refer to colorectal for further eval  Check labs  Recheck 1y or prn         Relevant Orders    CBC and differential    Ambulatory Referral to Colorectal Surgery       Other    Hypercholesterolemia     Check labs  Monitor diet  Continue atorvastatin  Recheck 1y or prn         Relevant Orders    Comprehensive metabolic panel    Lipid panel      Other Visit Diagnoses     Annual physical exam    -  Primary          Immunizations and preventive care screenings were discussed with patient today  Appropriate education was printed on patient's after visit summary  Counseling:  · Exercise: the importance of regular exercise/physical activity was discussed  Recommend exercise 3-5 times per week for at least 30 minutes  · BMI Counseling: Body mass index is 29 41 kg/m²  The BMI is above normal  Nutrition recommendations include reducing portion sizes, moderation in carbohydrate intake, increasing intake of lean protein, reducing intake of saturated fat and trans fat and reducing intake of cholesterol  Return in about 1 year (around 2023)  Chief Complaint:     Chief Complaint   Patient presents with    Physical Exam      History of Present Illness:     Adult Annual Physical   Patient here for a comprehensive physical exam  The patient reports problems - as below  - cut pantoprazole to 20mg qd 3 weeks ago   Since then, reflux has been controlled  - pt with intermittent blood seen on the stool and on the toilet paper over the last 3 weeks  Blood is bright red  No pain  Usually associated with constipation  - some discomfort and numbness near the posterior aspect of the R temple  Diet and Physical Activity  · Diet/Nutrition: well balanced diet  · Exercise: less than 30 minutes on average and 2-3 x a week on average  Depression Screening  PHQ-2/9 Depression Screening    Little interest or pleasure in doing things: 0 - not at all  Feeling down, depressed, or hopeless: 0 - not at all  PHQ-2 Score: 0  PHQ-2 Interpretation: Negative depression screen       General Health  · Sleep: sleeps well and gets 7-8 hours of sleep on average  · Hearing: normal - bilateral   · Vision: vision problems: has macular eye disease (dry type) OS and most recent eye exam <1 year ago  · Dental: regular dental visits   Health  · Symptoms include: none     Review of Systems:     Review of Systems   Constitutional: Negative  HENT: Negative  Eyes: Negative  Respiratory: Negative  Cardiovascular: Negative  Gastrointestinal: Positive for anal bleeding, blood in stool, constipation and diarrhea  Negative for abdominal distention and abdominal pain  Endocrine: Negative  Genitourinary: Negative  Musculoskeletal: Negative  Skin: Negative  Allergic/Immunologic: Negative  Neurological: Negative  Hematological: Negative  Psychiatric/Behavioral: Negative  Past Medical History:     Past Medical History:   Diagnosis Date    Allergic     GERD (gastroesophageal reflux disease)     High cholesterol     Seasonal allergies     feels well today regarding allergies 2/7/22      Past Surgical History:     History reviewed  No pertinent surgical history     Family History:     Family History   Problem Relation Age of Onset    Heart disease Mother         cardiac disorder    Hypertension Mother     Heart attack Mother         prior     Heart disease Father 39        CABG    Asthma Brother Social History:     Social History     Socioeconomic History    Marital status: /Civil Union     Spouse name: None    Number of children: None    Years of education: None    Highest education level: None   Occupational History     Comment: full time employment    Tobacco Use    Smoking status: Never Smoker    Smokeless tobacco: Former User     Types: Chew   Vaping Use    Vaping Use: Never used   Substance and Sexual Activity    Alcohol use: Yes     Comment: seldom-beer    Drug use: No    Sexual activity: Yes     Partners: Female   Other Topics Concern    None   Social History Narrative    Always uses seat belt    Works as     2 cups coffee/day     Social Determinants of Health     Financial Resource Strain: Not on file   Food Insecurity: Not on file   Transportation Needs: Not on file   Physical Activity: Not on file   Stress: Not on file   Social Connections: Not on file   Intimate Partner Violence: Not on file   Housing Stability: Not on file      Current Medications:     Current Outpatient Medications   Medication Sig Dispense Refill    atorvastatin (LIPITOR) 10 mg tablet TAKE 1 TABLET BY MOUTH EVERY DAY 90 tablet 1    cetirizine (ZyrTEC) 5 MG tablet Take 5 mg by mouth daily      Multiple Vitamins-Minerals (PRESERVISION AREDS PO) Take by mouth      pantoprazole (PROTONIX) 20 mg tablet Take 1 tablet (20 mg total) by mouth daily before breakfast 30 tablet 5     No current facility-administered medications for this visit  Allergies: Allergies   Allergen Reactions    Bee Venom Swelling     At sting site      Physical Exam:     /82   Pulse 76   Temp 98 5 °F (36 9 °C)   Ht 5' 10" (1 778 m)   Wt 93 kg (205 lb)   BMI 29 41 kg/m²     Physical Exam  Vitals reviewed  Constitutional:       Appearance: He is well-developed  HENT:      Head: Normocephalic and atraumatic        Right Ear: Tympanic membrane, ear canal and external ear normal       Left Ear: Ear canal and external ear normal       Mouth/Throat:      Mouth: Mucous membranes are moist    Eyes:      Extraocular Movements: Extraocular movements intact  Conjunctiva/sclera: Conjunctivae normal       Pupils: Pupils are equal, round, and reactive to light  Neck:      Thyroid: No thyromegaly  Vascular: No JVD  Cardiovascular:      Rate and Rhythm: Normal rate and regular rhythm  Pulses: Normal pulses  Heart sounds: Normal heart sounds  No murmur heard  Pulmonary:      Effort: Pulmonary effort is normal  No respiratory distress  Breath sounds: Normal breath sounds  No wheezing or rales  Abdominal:      General: Bowel sounds are normal  There is no distension  Palpations: Abdomen is soft  There is no mass  Tenderness: There is no abdominal tenderness  There is no right CVA tenderness or left CVA tenderness  Musculoskeletal:         General: No swelling, tenderness or deformity  Normal range of motion  Cervical back: Normal range of motion and neck supple  No tenderness  No muscular tenderness  Right lower leg: No edema  Left lower leg: No edema  Lymphadenopathy:      Cervical: No cervical adenopathy  Skin:     General: Skin is warm  Capillary Refill: Capillary refill takes less than 2 seconds  Neurological:      Mental Status: He is alert and oriented to person, place, and time  Cranial Nerves: No cranial nerve deficit  Sensory: No sensory deficit  Motor: No weakness or abnormal muscle tone  Coordination: Coordination normal       Gait: Gait normal       Deep Tendon Reflexes: Reflexes normal    Psychiatric:         Mood and Affect: Mood normal          Behavior: Behavior normal          Thought Content:  Thought content normal          Judgment: Judgment normal       Comments: PHQ-2/9 Depression Screening    Little interest or pleasure in doing things: 0 - not at all  Feeling down, depressed, or hopeless: 0 - not at all  PHQ-2 Score: 0  PHQ-2 Interpretation: Negative depression screen               Derek Ruelas MD  [facility]

## 2022-04-11 NOTE — PATIENT INSTRUCTIONS

## 2022-04-11 NOTE — ASSESSMENT & PLAN NOTE
Bright red - ?associated with constipation? Will refer to colorectal for further eval  Check labs   Recheck 1y or prn

## 2022-04-12 ENCOUNTER — APPOINTMENT (OUTPATIENT)
Dept: LAB | Facility: MEDICAL CENTER | Age: 42
End: 2022-04-12
Payer: COMMERCIAL

## 2022-04-12 DIAGNOSIS — K62.5 RECTAL BLEEDING: ICD-10-CM

## 2022-04-12 DIAGNOSIS — E78.00 HYPERCHOLESTEROLEMIA: ICD-10-CM

## 2022-04-12 LAB
ALBUMIN SERPL BCP-MCNC: 4.3 G/DL (ref 3.5–5)
ALP SERPL-CCNC: 53 U/L (ref 46–116)
ALT SERPL W P-5'-P-CCNC: 62 U/L (ref 12–78)
ANION GAP SERPL CALCULATED.3IONS-SCNC: 4 MMOL/L (ref 4–13)
AST SERPL W P-5'-P-CCNC: 31 U/L (ref 5–45)
BASOPHILS # BLD AUTO: 0.04 THOUSANDS/ΜL (ref 0–0.1)
BASOPHILS NFR BLD AUTO: 1 % (ref 0–1)
BILIRUB SERPL-MCNC: 0.44 MG/DL (ref 0.2–1)
BUN SERPL-MCNC: 12 MG/DL (ref 5–25)
CALCIUM SERPL-MCNC: 9.7 MG/DL (ref 8.3–10.1)
CHLORIDE SERPL-SCNC: 107 MMOL/L (ref 100–108)
CHOLEST SERPL-MCNC: 176 MG/DL
CO2 SERPL-SCNC: 28 MMOL/L (ref 21–32)
CREAT SERPL-MCNC: 0.93 MG/DL (ref 0.6–1.3)
EOSINOPHIL # BLD AUTO: 0.17 THOUSAND/ΜL (ref 0–0.61)
EOSINOPHIL NFR BLD AUTO: 3 % (ref 0–6)
ERYTHROCYTE [DISTWIDTH] IN BLOOD BY AUTOMATED COUNT: 12.4 % (ref 11.6–15.1)
GFR SERPL CREATININE-BSD FRML MDRD: 100 ML/MIN/1.73SQ M
GLUCOSE P FAST SERPL-MCNC: 89 MG/DL (ref 65–99)
HCT VFR BLD AUTO: 50 % (ref 36.5–49.3)
HDLC SERPL-MCNC: 29 MG/DL
HGB BLD-MCNC: 15.9 G/DL (ref 12–17)
IMM GRANULOCYTES # BLD AUTO: 0.01 THOUSAND/UL (ref 0–0.2)
IMM GRANULOCYTES NFR BLD AUTO: 0 % (ref 0–2)
LDLC SERPL CALC-MCNC: 106 MG/DL (ref 0–100)
LYMPHOCYTES # BLD AUTO: 2.71 THOUSANDS/ΜL (ref 0.6–4.47)
LYMPHOCYTES NFR BLD AUTO: 42 % (ref 14–44)
MCH RBC QN AUTO: 28 PG (ref 26.8–34.3)
MCHC RBC AUTO-ENTMCNC: 31.8 G/DL (ref 31.4–37.4)
MCV RBC AUTO: 88 FL (ref 82–98)
MONOCYTES # BLD AUTO: 0.61 THOUSAND/ΜL (ref 0.17–1.22)
MONOCYTES NFR BLD AUTO: 9 % (ref 4–12)
NEUTROPHILS # BLD AUTO: 2.95 THOUSANDS/ΜL (ref 1.85–7.62)
NEUTS SEG NFR BLD AUTO: 45 % (ref 43–75)
NONHDLC SERPL-MCNC: 147 MG/DL
NRBC BLD AUTO-RTO: 0 /100 WBCS
PLATELET # BLD AUTO: 301 THOUSANDS/UL (ref 149–390)
PMV BLD AUTO: 9.8 FL (ref 8.9–12.7)
POTASSIUM SERPL-SCNC: 4.8 MMOL/L (ref 3.5–5.3)
PROT SERPL-MCNC: 8 G/DL (ref 6.4–8.2)
RBC # BLD AUTO: 5.67 MILLION/UL (ref 3.88–5.62)
SODIUM SERPL-SCNC: 139 MMOL/L (ref 136–145)
TRIGL SERPL-MCNC: 206 MG/DL
WBC # BLD AUTO: 6.49 THOUSAND/UL (ref 4.31–10.16)

## 2022-04-12 PROCEDURE — 80061 LIPID PANEL: CPT

## 2022-04-12 PROCEDURE — 80053 COMPREHEN METABOLIC PANEL: CPT

## 2022-04-12 PROCEDURE — 85025 COMPLETE CBC W/AUTO DIFF WBC: CPT

## 2022-04-12 PROCEDURE — 36415 COLL VENOUS BLD VENIPUNCTURE: CPT

## 2022-04-14 ENCOUNTER — HOSPITAL ENCOUNTER (OUTPATIENT)
Dept: CT IMAGING | Facility: HOSPITAL | Age: 42
Discharge: HOME | End: 2022-04-14
Attending: INTERNAL MEDICINE
Payer: COMMERCIAL

## 2022-04-14 DIAGNOSIS — R07.81 ANTERIOR PLEURITIC PAIN: ICD-10-CM

## 2022-04-14 DIAGNOSIS — R10.11 RIGHT UPPER QUADRANT ABDOMINAL PAIN: ICD-10-CM

## 2022-04-14 PROCEDURE — G1004 CDSM NDSC: HCPCS

## 2022-04-14 PROCEDURE — 74177 CT ABD & PELVIS W/CONTRAST: CPT

## 2022-04-14 RX ADMIN — IOHEXOL 100 ML: 350 INJECTION, SOLUTION INTRAVENOUS at 19:17

## 2022-04-20 DIAGNOSIS — R93.2 ABNORMAL CT OF LIVER: Primary | ICD-10-CM

## 2022-04-20 DIAGNOSIS — K76.9 LIVER LESION: ICD-10-CM

## 2022-04-22 ENCOUNTER — TELEPHONE (OUTPATIENT)
Dept: GASTROENTEROLOGY | Facility: CLINIC | Age: 42
End: 2022-04-22

## 2022-04-22 NOTE — TELEPHONE ENCOUNTER
[facility] [place] whom is asking if MRI is to be of Pelvis which was ordered or if to be of Abdomen w/ and w/o  Please call her back at [phone]  Thank you so much!

## 2022-05-18 ENCOUNTER — HOSPITAL ENCOUNTER (OUTPATIENT)
Dept: MRI IMAGING | Facility: HOSPITAL | Age: 42
Discharge: HOME | End: 2022-05-18
Payer: COMMERCIAL

## 2022-05-18 DIAGNOSIS — K76.9 LIVER LESION: ICD-10-CM

## 2022-05-18 DIAGNOSIS — R93.2 ABNORMAL CT OF LIVER: ICD-10-CM

## 2022-05-18 PROCEDURE — G1004 CDSM NDSC: HCPCS

## 2022-05-18 PROCEDURE — 76376 3D RENDER W/INTRP POSTPROCES: CPT

## 2022-05-18 PROCEDURE — A9585 GADOBUTROL INJECTION: HCPCS | Performed by: NURSE PRACTITIONER

## 2022-05-18 PROCEDURE — 74183 MRI ABD W/O CNTR FLWD CNTR: CPT

## 2022-05-18 RX ADMIN — GADOBUTROL 9 ML: 604.72 INJECTION INTRAVENOUS at 18:36

## 2022-05-25 ENCOUNTER — OFFICE VISIT (OUTPATIENT)
Dept: GASTROENTEROLOGY | Facility: CLINIC | Age: 42
End: 2022-05-25
Payer: COMMERCIAL

## 2022-05-25 VITALS
WEIGHT: 201 LBS | HEART RATE: 85 BPM | SYSTOLIC BLOOD PRESSURE: 131 MMHG | BODY MASS INDEX: 28.77 KG/M2 | DIASTOLIC BLOOD PRESSURE: 85 MMHG | HEIGHT: 70 IN

## 2022-05-25 DIAGNOSIS — K76.0 FATTY LIVER: ICD-10-CM

## 2022-05-25 DIAGNOSIS — R16.0 HEPATOMEGALY: ICD-10-CM

## 2022-05-25 DIAGNOSIS — R93.2 ABNORMAL CT OF LIVER: ICD-10-CM

## 2022-05-25 DIAGNOSIS — K21.9 GASTROESOPHAGEAL REFLUX DISEASE, UNSPECIFIED WHETHER ESOPHAGITIS PRESENT: ICD-10-CM

## 2022-05-25 DIAGNOSIS — D18.03 LIVER HEMANGIOMA: Primary | ICD-10-CM

## 2022-05-25 DIAGNOSIS — K76.9 LIVER LESION: ICD-10-CM

## 2022-05-25 PROCEDURE — 3008F BODY MASS INDEX DOCD: CPT | Performed by: INTERNAL MEDICINE

## 2022-05-25 PROCEDURE — 1036F TOBACCO NON-USER: CPT | Performed by: INTERNAL MEDICINE

## 2022-05-25 PROCEDURE — 99214 OFFICE O/P EST MOD 30 MIN: CPT | Performed by: INTERNAL MEDICINE

## 2022-05-25 NOTE — PROGRESS NOTES
[facility] - Outpatient Follow-up Note  Dereck Hernandez 35 y o  male MRN: [id number]  Encounter: [id number]          ASSESSMENT AND PLAN:      1  Liver hemangioma    2  Gastroesophageal reflux disease, unspecified whether esophagitis present    3  Liver lesion    4  Abnormal CT of liver    5  Hepatomegaly    6  Fatty liver      Patient has history of heartburn acid reflux indigestion, anti-reflux measures reviewed diet discussed, may take PPI for now, after couple months can try to taper off and monitor response  May take Gaviscon or such antacid as needed for symptomatic relief  Anti-reflux measures discussed and reinforced  EGD biopsy results noted and reviewed  Incidental finding of liver lesions on CT imaging, confirmed to be hemangiomas on MRI  Does have borderline hepatomegaly and fatty liver  Discussed with patient   ______________________________________________________________________    SUBJECTIVE:    Patient came in for follow-up evaluation of history of acid reflux and abnormal imaging  His acid reflux is fairly well managed on PPI, is trying to watch his diet, has occasional burning sensation in the chest   May use antacid  Denies dysphagia coughing choking spells nocturnal symptoms sometimes works night shifts  He had his CT of the abdomen done for abdominal discomfort found to have liver lesions MRI was recommended, same was done and suggest those lesions are liver hemangiomas  Patient denies any chest pain shortness of breath fever chills rash, diet medications more than 10 pertinent systems on the current and past records reviewed  Had a colonoscopy by Dr Maria Luisa Light for rectal bleeding, reportedly unremarkable  REVIEW OF SYSTEMS IS OTHERWISE NEGATIVE        Historical Information   Past Medical History:   Diagnosis Date    Allergic     GERD (gastroesophageal reflux disease)     High cholesterol     Seasonal allergies     feels well today regarding allergies 2/7/22     Past Surgical History:   Procedure Laterality Date    COLONOSCOPY      UPPER GASTROINTESTINAL ENDOSCOPY       Social History   Social History     Substance and Sexual Activity   Alcohol Use Yes    Comment: seldom-beer     Social History     Substance and Sexual Activity   Drug Use No     Social History     Tobacco Use   Smoking Status Never Smoker   Smokeless Tobacco Former User    Types: Pearletha Shires Quit date: 12/13/2021     Family History   Problem Relation Age of Onset    Heart disease Mother         cardiac disorder    Hypertension Mother     Heart attack Mother         prior     Heart disease Father 39        CABG    Asthma Brother        Meds/Allergies       Current Outpatient Medications:     atorvastatin (LIPITOR) 10 mg tablet    cetirizine (ZyrTEC) 5 MG tablet    Multiple Vitamins-Minerals (PRESERVISION AREDS PO)    pantoprazole (PROTONIX) 20 mg tablet    Allergies   Allergen Reactions    Bee Venom Swelling     At sting site           Objective     Blood pressure 131/85, pulse 85, height 5' 10" (1 778 m), weight 91 2 kg (201 lb)  Body mass index is 28 84 kg/m²  PHYSICAL EXAM:      General Appearance:   Alert, cooperative, no distress   HEENT:   Normocephalic, atraumatic, anicteric  Neck:  Supple, symmetrical, trachea midline   Lungs:   Clear to auscultation bilaterally; no rales, rhonchi or wheezing; respirations unlabored    Heart[de-identified]   Regular rate and rhythm; no murmur  Abdomen:   Soft, non-tender, non-distended; normal bowel sounds; no masses, no organomegaly    Genitalia:   Deferred    Rectal:   Deferred    Extremities:  No cyanosis, clubbing or edema    Skin:  No jaundice, rashes, or lesions    Lymph nodes:  No palpable cervical lymphadenopathy        Lab Results:   No visits with results within 1 Day(s) from this visit     Latest known visit with results is:   Appointment on 04/12/2022   Component Date Value    WBC 04/12/2022 6 49     RBC 04/12/2022 5 67 (A)  Hemoglobin 04/12/2022 15 9     Hematocrit 04/12/2022 50 0 (A)    MCV 04/12/2022 88     MCH 04/12/2022 28 0     MCHC 04/12/2022 31 8     RDW 04/12/2022 12 4     MPV 04/12/2022 9 8     Platelets 87/21/3457 301     nRBC 04/12/2022 0     Neutrophils Relative 04/12/2022 45     Immat GRANS % 04/12/2022 0     Lymphocytes Relative 04/12/2022 42     Monocytes Relative 04/12/2022 9     Eosinophils Relative 04/12/2022 3     Basophils Relative 04/12/2022 1     Neutrophils Absolute 04/12/2022 2 95     Immature Grans Absolute 04/12/2022 0 01     Lymphocytes Absolute 04/12/2022 2 71     Monocytes Absolute 04/12/2022 0 61     Eosinophils Absolute 04/12/2022 0 17     Basophils Absolute 04/12/2022 0 04     Sodium 04/12/2022 139     Potassium 04/12/2022 4 8     Chloride 04/12/2022 107     CO2 04/12/2022 28     ANION GAP 04/12/2022 4     BUN 04/12/2022 12     Creatinine 04/12/2022 0 93     Glucose, Fasting 04/12/2022 89     Calcium 04/12/2022 9 7     AST 04/12/2022 31     ALT 04/12/2022 62     Alkaline Phosphatase 04/12/2022 53     Total Protein 04/12/2022 8 0     Albumin 04/12/2022 4 3     Total Bilirubin 04/12/2022 0 44     eGFR 04/12/2022 100     Cholesterol 04/12/2022 176     Triglycerides 04/12/2022 206 (A)    HDL, Direct 04/12/2022 29 (A)    LDL Calculated 04/12/2022 106 (A)    Non-HDL-Chol (CHOL-HDL) 04/12/2022 147          Radiology Results:   MRI abdomen w wo contrast and mrcp    Result Date: 5/23/2022  Narrative: MRI OF THE ABDOMEN WITH AND WITHOUT CONTRAST WITH MRCP INDICATION: 35 years / Male  R93 2: Abnormal findings on diagnostic imaging of liver and biliary tract K76 9: Liver disease, unspecified  COMPARISON: CT abdomen/pelvis dated 4/14/2022   TECHNIQUE:  The following pulse sequences were obtained:  Coronal and axial T2 with TE of 90 and 180 respectively, axial T2 with fat saturation, axial FIESTA fat-sat, axial T1-weighted in-and-out-of phase, axial DWI/ADC, pre-contrast axial T1 with fat saturation, post-contrast dynamic axial T1 with fat saturation at 20, 70, and 180 seconds, followed by coronal and 7 minute delayed axial T1 with fat saturation  3D MRCP images were obtained with radial thick slabs and projections  3D rendering was performed from the acquisition scanner  IV Contrast:  9 mL of Gadobutrol injection (SINGLE-DOSE) FINDINGS: LOWER CHEST:   Unremarkable  LIVER: The liver is enlarged measuring up to 17 5 cm in length  There is diffuse loss of signal throughout the liver on opposed phase imaging consistent with fatty infiltration  There are 2 adjacent masses measuring 2 6 x 2 6 cm in segment 8 (image 32, series 12 and 2 9 x 2 6 cm in segment 6 (image 41, series 12) which are T2 hyperintense and demonstrate peripheral nodular discontinuous enhancement and central filling on delayed  imaging consistent with hemangiomata  Smaller subcentimeter T2 hyperintense delayed phase homogeneously enhancing foci in segment 7 (image 251, series 12) and segment 4A (image 255, series 12) are also likely hemangiomata  The hepatic veins and portal veins are patent  BILE DUCTS:  No intrahepatic or extrahepatic bile duct dilation  Common bile duct is normal in caliber  No choledocholithiasis, biliary stricture or suspicious mass  GALLBLADDER: Unremarkable  PANCREAS:  Unremarkable  ADRENAL GLANDS:  Unremarkable  SPLEEN:  Unremarkable  KIDNEYS/PROXIMAL URETERS:  No hydroureteronephrosis  No suspicious renal mass  BOWEL:   No dilated loops of bowel  PERITONEUM/RETROPERITONEUM:  No ascites  LYMPH NODES:  No abdominal lymphadenopathy  VASCULAR STRUCTURES:  No aneurysm  ABDOMINAL WALL:  Unremarkable  OSSEOUS STRUCTURES:  No suspicious osseous lesion  Impression: Two adjacent right hepatic lobe lesions measuring up to 2 9 cm consistent with hemangiomata  Two smaller subcentimeter liver lesions are also likely hemangiomata  Hepatomegaly/hepatic steatosis   Workstation performed: [id number]

## 2022-06-30 DIAGNOSIS — E78.00 HYPERCHOLESTEROLEMIA: ICD-10-CM

## 2022-06-30 RX ORDER — ATORVASTATIN CALCIUM 10 MG/1
TABLET, FILM COATED ORAL
Qty: 90 TABLET | Refills: 1 | Status: SHIPPED | OUTPATIENT
Start: 2022-06-30

## 2022-12-09 DIAGNOSIS — E78.00 HYPERCHOLESTEROLEMIA: ICD-10-CM

## 2022-12-09 RX ORDER — ATORVASTATIN CALCIUM 10 MG/1
TABLET, FILM COATED ORAL
Qty: 90 TABLET | Refills: 1 | Status: SHIPPED | OUTPATIENT
Start: 2022-12-09

## 2023-03-02 ENCOUNTER — OFFICE VISIT (OUTPATIENT)
Dept: URGENT CARE | Facility: MEDICAL CENTER | Age: 43
End: 2023-03-02

## 2023-03-02 VITALS
HEIGHT: 70 IN | OXYGEN SATURATION: 94 % | WEIGHT: 208.4 LBS | DIASTOLIC BLOOD PRESSURE: 88 MMHG | RESPIRATION RATE: 22 BRPM | TEMPERATURE: 97.5 F | BODY MASS INDEX: 29.84 KG/M2 | HEART RATE: 80 BPM | SYSTOLIC BLOOD PRESSURE: 140 MMHG

## 2023-03-02 DIAGNOSIS — R05.3 PERSISTENT COUGH: Primary | ICD-10-CM

## 2023-03-02 RX ORDER — BECLOMETHASONE DIPROPIONATE HFA 80 UG/1
2 AEROSOL, METERED RESPIRATORY (INHALATION) 2 TIMES DAILY
Qty: 10.6 G | Refills: 0 | Status: SHIPPED | OUTPATIENT
Start: 2023-03-02 | End: 2023-03-16

## 2023-03-02 RX ORDER — ALBUTEROL SULFATE 90 UG/1
2 AEROSOL, METERED RESPIRATORY (INHALATION) EVERY 6 HOURS PRN
Qty: 18 G | Refills: 0 | Status: SHIPPED | OUTPATIENT
Start: 2023-03-02 | End: 2023-03-09

## 2023-03-02 NOTE — PATIENT INSTRUCTIONS
1  Take Q-jalyn 2 puffs twice daily until re-check by PCP  2  Use Ventolin 2 puffs every 4-6 hours as needed until cough free  3   Follow up with PCP in 3-5 days if symptoms persist

## 2023-03-02 NOTE — PROGRESS NOTES
[facility]        NAME: Rachel Arce is a 38 y o  male  : 1980    MRN: [id number]  DATE: 2023  TIME: 1:52 PM    Assessment and Plan   Persistent cough [R05 3]  1  Persistent cough  beclomethasone (Qvar RediHaler) 80 MCG/ACT inhaler    albuterol (Ventolin HFA) 90 mcg/act inhaler            Patient Instructions     1  Take Q-jalyn 2 puffs twice daily until re-check by PCP  2  Use Ventolin 2 puffs every 4-6 hours as needed until cough free  3  Follow up with PCP in 3-5 days if symptoms persist       Chief Complaint     Chief Complaint   Patient presents with   • Cough     Pt reports has a cough since the 2nd week in January           History of Present Illness       Hussein Curtis a 53-year-old male who presents with a 2-month history of persistent, dry cough  Patient reports his symptoms started initially with an upper respiratory infection  He reports his nasal symptoms have cleared but his cough has remained  Patient reports his cough is worse at night with any physical exertion  He denies any chest pain but does report some shortness of breath  Patient denies any history of asthma but has used an Advair discus inhaler and Ventolin previously  Cough        Review of Systems   Review of Systems   Constitutional: Negative  HENT: Negative  Respiratory: Positive for cough and chest tightness  Cardiovascular: Negative  Gastrointestinal: Negative            Current Medications       Current Outpatient Medications:   •  albuterol (Ventolin HFA) 90 mcg/act inhaler, Inhale 2 puffs every 6 (six) hours as needed for shortness of breath for up to 7 days, Disp: 18 g, Rfl: 0  •  atorvastatin (LIPITOR) 10 mg tablet, TAKE 1 TABLET BY MOUTH EVERY DAY, Disp: 90 tablet, Rfl: 1  •  beclomethasone (Qvar RediHaler) 80 MCG/ACT inhaler, Inhale 2 puffs 2 (two) times a day for 14 days Rinse mouth after use , Disp: 10 6 g, Rfl: 0  •  cetirizine (ZyrTEC) 5 MG tablet, Take 5 mg by mouth daily, Disp: , Rfl:   • Multiple Vitamins-Minerals (PRESERVISION AREDS PO), Take by mouth, Disp: , Rfl:   •  pantoprazole (PROTONIX) 20 mg tablet, Take 1 tablet (20 mg total) by mouth daily before breakfast (Patient not taking: Reported on 3/2/2023), Disp: 30 tablet, Rfl: 5    Current Allergies     Allergies as of 03/02/2023 - Reviewed 03/02/2023   Allergen Reaction Noted   • Bee venom Swelling 10/29/2013            The following portions of the patient's history were reviewed and updated as appropriate: allergies, current medications, past family history, past medical history, past social history, past surgical history and problem list      Past Medical History:   Diagnosis Date   • Allergic    • GERD (gastroesophageal reflux disease)    • High cholesterol    • Seasonal allergies     feels well today regarding allergies 2/7/22       Past Surgical History:   Procedure Laterality Date   • COLONOSCOPY     • UPPER GASTROINTESTINAL ENDOSCOPY         Family History   Problem Relation Age of Onset   • Heart disease Mother         cardiac disorder   • Hypertension Mother    • Heart attack Mother         prior    • Heart disease Father 39        CABG   • Asthma Brother          Medications have been verified  Objective   /88   Pulse 80   Temp 97 5 °F (36 4 °C)   Resp 22   Ht 5' 10" (1 778 m)   Wt 94 5 kg (208 lb 6 4 oz)   SpO2 94%   BMI 29 90 kg/m²   No LMP for male patient  Physical Exam     Physical Exam  Constitutional:       General: He is not in acute distress  Appearance: Normal appearance  He is not ill-appearing  HENT:      Head: Normocephalic and atraumatic  Right Ear: Tympanic membrane and ear canal normal       Left Ear: Tympanic membrane and ear canal normal       Nose: Nose normal       Mouth/Throat:      Lips: Pink  Pharynx: Oropharynx is clear  Cardiovascular:      Rate and Rhythm: Normal rate and regular rhythm        Heart sounds: Normal heart sounds, S1 normal and S2 normal  No murmur heard   Pulmonary:      Effort: Pulmonary effort is normal       Breath sounds: Normal air entry  No wheezing or rhonchi  Neurological:      Mental Status: He is alert  Maye history and exam findings with patient, concern for mild reactive airway disease

## 2023-03-09 ENCOUNTER — RA CDI HCC (OUTPATIENT)
Dept: OTHER | Facility: HOSPITAL | Age: 43
End: 2023-03-09

## 2023-03-09 NOTE — PROGRESS NOTES
[facility]  [facility]       Chart reviewed, no opportunity found: CHART REVIEWED, NO OPPORTUNITY FOUND        Patients Insurance        Commercial Insurance: [address] [place] [address]

## 2023-03-16 ENCOUNTER — APPOINTMENT (OUTPATIENT)
Dept: RADIOLOGY | Facility: MEDICAL CENTER | Age: 43
End: 2023-03-16

## 2023-03-16 ENCOUNTER — OFFICE VISIT (OUTPATIENT)
Dept: FAMILY MEDICINE CLINIC | Facility: CLINIC | Age: 43
End: 2023-03-16

## 2023-03-16 VITALS
DIASTOLIC BLOOD PRESSURE: 82 MMHG | HEART RATE: 80 BPM | HEIGHT: 70 IN | WEIGHT: 209 LBS | OXYGEN SATURATION: 98 % | TEMPERATURE: 97.2 F | BODY MASS INDEX: 29.92 KG/M2 | SYSTOLIC BLOOD PRESSURE: 122 MMHG

## 2023-03-16 DIAGNOSIS — Z13.6 SCREENING FOR CARDIOVASCULAR, RESPIRATORY, AND GENITOURINARY DISEASES: ICD-10-CM

## 2023-03-16 DIAGNOSIS — R05.3 PERSISTENT COUGH: ICD-10-CM

## 2023-03-16 DIAGNOSIS — Z13.89 SCREENING FOR CARDIOVASCULAR, RESPIRATORY, AND GENITOURINARY DISEASES: ICD-10-CM

## 2023-03-16 DIAGNOSIS — Z13.83 SCREENING FOR CARDIOVASCULAR, RESPIRATORY, AND GENITOURINARY DISEASES: ICD-10-CM

## 2023-03-16 DIAGNOSIS — R05.3 PERSISTENT COUGH: Primary | ICD-10-CM

## 2023-03-16 RX ORDER — AZITHROMYCIN 250 MG/1
TABLET, FILM COATED ORAL
Qty: 6 TABLET | Refills: 0 | Status: SHIPPED | OUTPATIENT
Start: 2023-03-16 | End: 2023-03-20

## 2023-03-16 NOTE — PROGRESS NOTES
Name: Jay Jay Mckenna      : 1980      MRN: [id number]  Encounter Provider: Rahat Castro MD  Encounter Date: 3/16/2023   Encounter department: [address]     1  Persistent cough  -     XR chest pa & lateral; Future; Expected date: 2023  -     azithromycin (ZITHROMAX) 250 mg tablet; 2 tab today then 1 tab po qd x 4 d  -     CBC and differential; Future  -     Comprehensive metabolic panel; Future    2  Screening for cardiovascular, respiratory, and genitourinary diseases  -     CBC and differential; Future  -     Comprehensive metabolic panel; Future  -     Lipid panel; Future  Discussion: persistent cough with unclear cause  ?persistent inflammation s/p mild URI? Check CXR  Trial of z-pack  Labs ordered for his upcoming physical   Recheck 1w if not improving - earlier if worse       Subjective     40-year-old male presents with a 2-month history of cough  Patient states that his wife was sick in January with a non-COVID illness  Patient states that he started coughing then  Initially it was nonproductive  He was seen at urgent care but no treatment was recommended  Patient then developed URI symptoms during which cough became productive  Since then, URI symptoms including nasal congestion have resolved though cough has remained  Its still occasionally productive  He can work some briefly at the beginning of exercise, and then comes on when he is done exercising  Patient denies any ongoing fever/chills or shortness of breath with or without exertion  Patient does not smoke and does not have a history of asthma  He was seen at the beginning of March at urgent care and started on beclomethasone and albuterol inhalers  Patient has not noted any improvement with these inhalers    Review of Systems   Constitutional: Negative  HENT: Negative  Eyes: Negative  Respiratory: Positive for cough  Negative for shortness of breath  Cardiovascular: Negative  Gastrointestinal: Negative  Musculoskeletal: Negative          Past Medical History:   Diagnosis Date   • Allergic    • GERD (gastroesophageal reflux disease)    • High cholesterol    • Seasonal allergies     feels well today regarding allergies 2/7/22     Past Surgical History:   Procedure Laterality Date   • COLONOSCOPY     • UPPER GASTROINTESTINAL ENDOSCOPY       Family History   Problem Relation Age of Onset   • Heart disease Mother         cardiac disorder   • Hypertension Mother    • Heart attack Mother         prior    • Heart disease Father 39        CABG   • Asthma Brother      Social History     Socioeconomic History   • Marital status: /Civil Union     Spouse name: None   • Number of children: None   • Years of education: None   • Highest education level: None   Occupational History     Comment: full time employment    Tobacco Use   • Smoking status: Never   • Smokeless tobacco: Former     Types: Chew     Quit date: 12/13/2021   Vaping Use   • Vaping Use: Never used   Substance and Sexual Activity   • Alcohol use: Yes     Comment: seldom-beer   • Drug use: No   • Sexual activity: Yes     Partners: Female   Other Topics Concern   • None   Social History Narrative    Always uses seat belt    Works as     2 cups coffee/day     Social Determinants of Health     Financial Resource Strain: Not on file   Food Insecurity: Not on file   Transportation Needs: Not on file   Physical Activity: Not on file   Stress: Not on file   Social Connections: Not on file   Intimate Partner Violence: Not on file   Housing Stability: Not on file     Current Outpatient Medications on File Prior to Visit   Medication Sig   • atorvastatin (LIPITOR) 10 mg tablet TAKE 1 TABLET BY MOUTH EVERY DAY   • cetirizine (ZyrTEC) 5 MG tablet Take 5 mg by mouth daily   • Multiple Vitamins-Minerals (PRESERVISION AREDS PO) Take by mouth   • beclomethasone (Qvar RediHaler) 80 MCG/ACT inhaler Inhale 2 puffs 2 (two) times a day for 14 days Rinse mouth after use  Allergies   Allergen Reactions   • Bee Venom Swelling     At sting site   • Seasonal Ic [Cholestatin] Itching     Immunization History   Administered Date(s) Administered   • COVID-19 PFIZER VACCINE 0 3 ML IM 04/30/2021, 05/24/2021   • DT (pediatric) 08/16/1994   • MMR 08/16/1994   • OPV 03/30/1996       Objective     /82 (BP Location: Left arm, Patient Position: Sitting, Cuff Size: Large)   Pulse 80   Temp (!) 97 2 °F (36 2 °C)   Ht 5' 10" (1 778 m)   Wt 94 8 kg (209 lb)   SpO2 98%   BMI 29 99 kg/m²     Physical Exam  Vitals reviewed  HENT:      Head: Normocephalic  Right Ear: Tympanic membrane, ear canal and external ear normal       Left Ear: Tympanic membrane, ear canal and external ear normal       Nose: No congestion  Mouth/Throat:      Mouth: Mucous membranes are moist    Eyes:      Extraocular Movements: Extraocular movements intact  Conjunctiva/sclera: Conjunctivae normal       Pupils: Pupils are equal, round, and reactive to light  Cardiovascular:      Rate and Rhythm: Normal rate and regular rhythm  Pulses: Normal pulses  Pulmonary:      Effort: Pulmonary effort is normal       Breath sounds: No wheezing or rales  Musculoskeletal:      Cervical back: Normal range of motion  No tenderness  Right lower leg: No edema  Left lower leg: No edema  Lymphadenopathy:      Cervical: No cervical adenopathy  Skin:     Capillary Refill: Capillary refill takes less than 2 seconds  Neurological:      Mental Status: He is alert         Rubi Gonzales MD

## 2023-03-24 ENCOUNTER — TELEPHONE (OUTPATIENT)
Dept: FAMILY MEDICINE CLINIC | Facility: CLINIC | Age: 43
End: 2023-03-24

## 2023-03-24 DIAGNOSIS — R05.3 PERSISTENT COUGH: Primary | ICD-10-CM

## 2023-03-24 RX ORDER — AZITHROMYCIN 250 MG/1
TABLET, FILM COATED ORAL
Qty: 6 TABLET | Refills: 0 | Status: SHIPPED | OUTPATIENT
Start: 2023-03-24 | End: 2023-03-28

## 2023-04-14 PROBLEM — R04.0 RECURRENT EPISTAXIS: Status: ACTIVE | Noted: 2023-04-14

## 2023-05-16 NOTE — TELEPHONE ENCOUNTER
Attempted to call pt to schedule Lumbar NATASHA with Dr. Nieves.  Left voicemail for pt to return our call.   Order corrected and spoke to [facility]   Thank you

## 2023-06-10 DIAGNOSIS — E78.00 HYPERCHOLESTEROLEMIA: ICD-10-CM

## 2023-06-12 RX ORDER — ATORVASTATIN CALCIUM 10 MG/1
TABLET, FILM COATED ORAL
Qty: 90 TABLET | Refills: 1 | Status: SHIPPED | OUTPATIENT
Start: 2023-06-12

## 2023-11-21 ENCOUNTER — OFFICE VISIT (OUTPATIENT)
Dept: FAMILY MEDICINE CLINIC | Facility: CLINIC | Age: 43
End: 2023-11-21
Payer: COMMERCIAL

## 2023-11-21 VITALS
WEIGHT: 206.8 LBS | OXYGEN SATURATION: 98 % | DIASTOLIC BLOOD PRESSURE: 78 MMHG | SYSTOLIC BLOOD PRESSURE: 120 MMHG | BODY MASS INDEX: 29.6 KG/M2 | TEMPERATURE: 97.8 F | HEIGHT: 70 IN | HEART RATE: 87 BPM

## 2023-11-21 DIAGNOSIS — R04.0 RECURRENT EPISTAXIS: Primary | ICD-10-CM

## 2023-11-21 DIAGNOSIS — L82.1 SEBORRHEIC KERATOSIS: ICD-10-CM

## 2023-11-21 PROCEDURE — 99213 OFFICE O/P EST LOW 20 MIN: CPT | Performed by: FAMILY MEDICINE

## 2023-11-21 PROCEDURE — 30901 CONTROL OF NOSEBLEED: CPT | Performed by: FAMILY MEDICINE

## 2023-11-21 NOTE — ASSESSMENT & PLAN NOTE
Dilated, recently bleeding vessels treated with silver nitrate. No active bleeding after treatment. I reviewed further care including but not limited to avoiding trauma, blowing, sneezing. Recheck 1w to assess effect of treatment - earlier if bleeding should return.  Pt to call for problems or concerns in the interim

## 2023-11-21 NOTE — PROGRESS NOTES
Name: Chula Hobbs      : 1980      MRN: [id number]  Encounter Provider: Joceline Barry MD  Encounter Date: 2023   Encounter department: [address] [place] [address]     1. Recurrent epistaxis  Assessment & Plan:  Dilated, recently bleeding vessels treated with silver nitrate. No active bleeding after treatment. I reviewed further care including but not limited to avoiding trauma, blowing, sneezing. Recheck 1w to assess effect of treatment - earlier if bleeding should return. Pt to call for problems or concerns in the interim    Orders:  -     Epistaxis management    2. Seborrheic keratosis        Depression Screening and Follow-up Plan: Patient was screened for depression during today's encounter. They screened negative with a PHQ-2 score of 0. Subjective     Here for ac couple of concerns  -68-year-old male presents with a history of recurrent nosebleeds when the weather gets cold. Patient states that he typically on the left side, and occur after he eats. If he is in a warm area like at home, it does not seem to occur as much. When working outside, it seems to occur much more commonly. Patient denies any trauma. Last nosebleed was yesterday 2 days ago. He is here for evaluation  -Patient also has a mole on the right side of his chest that he would like to have checked. He does not remember having this before. He notices coming out of the shower the other day. Review of Systems   Constitutional: Negative. HENT:  Positive for nosebleeds. Negative for congestion, ear discharge, ear pain, postnasal drip and rhinorrhea. Eyes: Negative. Respiratory: Negative.      Skin:         R chest wall lesion       Past Medical History:   Diagnosis Date   • Allergic    • GERD (gastroesophageal reflux disease)    • High cholesterol    • Seasonal allergies     feels well today regarding allergies 22     Past Surgical History:   Procedure Laterality Date   • COLONOSCOPY     • UPPER GASTROINTESTINAL ENDOSCOPY       Family History   Problem Relation Age of Onset   • Heart disease Mother         cardiac disorder   • Hypertension Mother    • Heart attack Mother         prior    • Heart disease Father 39        CABG   • Asthma Brother      Social History     Socioeconomic History   • Marital status: /Civil Union     Spouse name: None   • Number of children: None   • Years of education: None   • Highest education level: None   Occupational History     Comment: full time employment    Tobacco Use   • Smoking status: Never   • Smokeless tobacco: Former     Types: Chew     Quit date: 12/13/2021   Vaping Use   • Vaping Use: Never used   Substance and Sexual Activity   • Alcohol use: Yes     Comment: seldom-beer   • Drug use: No   • Sexual activity: Yes     Partners: Female   Other Topics Concern   • None   Social History Narrative    Always uses seat belt    Works as     2 cups coffee/day     Social Determinants of Health     Financial Resource Strain: Not on file   Food Insecurity: Not on file   Transportation Needs: Not on file   Physical Activity: Not on file   Stress: Not on file   Social Connections: Not on file   Intimate Partner Violence: Not on file   Housing Stability: Not on file     Current Outpatient Medications on File Prior to Visit   Medication Sig   • atorvastatin (LIPITOR) 10 mg tablet TAKE 1 TABLET BY MOUTH EVERY DAY   • cetirizine (ZyrTEC) 5 MG tablet Take 5 mg by mouth daily   • Multiple Vitamins-Minerals (PRESERVISION AREDS PO) Take by mouth     Allergies   Allergen Reactions   • Bee Venom Swelling     At sting site   • Seasonal Ic [Cholestatin] Itching     Immunization History   Administered Date(s) Administered   • COVID-19 PFIZER VACCINE 0.3 ML IM 04/30/2021, 05/24/2021   • DT (pediatric) 08/16/1994   • MMR 08/16/1994   • OPV 03/30/1996       Objective     /78 (BP Location: Left arm, Patient Position: Sitting, Cuff Size: Adult)   Pulse 87   Temp 97.8 °F (36.6 °C)   Ht 5' 10" (1.778 m)   Wt 93.8 kg (206 lb 12.8 oz)   SpO2 98%   BMI 29.67 kg/m²     Physical Exam  Vitals reviewed. HENT:      Head: Normocephalic. Nose: Nose normal.      Comments: L nare with multiple dilated vessels in the anterior plexus. Some crusting. Mouth/Throat:      Mouth: Mucous membranes are moist.   Eyes:      Conjunctiva/sclera: Conjunctivae normal.   Lymphadenopathy:      Cervical: No cervical adenopathy. Skin:     Comments: R chest wall with benign appearing SK   Neurological:      Mental Status: He is alert. Epistaxis management    Date/Time: 11/21/2023 8:15 AM    Performed by: Erika Bailon MD  Authorized by: Erika Bailon MD  Universal Protocol:  Consent: Verbal consent obtained. Consent given by: patient  Patient understanding: patient states understanding of the procedure being performed  Patient consent: the patient's understanding of the procedure matches consent given  Procedure consent: procedure consent matches procedure scheduled  Patient identity confirmed: verbally with patient    Patient location:  Clinic  Anesthesia (see MAR for exact dosages): Anesthesia method:  None  Procedure details:     Treatment site:  L anterior    Hemostasis method:  Silver nitrate    Approach:  External    Spec Headlamp used: No      Treatment complexity:  Limited    Treatment episode: initial    Post-procedure details:     Post-procedure assessment: dilated vessels treated. Patient tolerance of procedure: Tolerated well, no immediate complications  Comments: All dilated vessels treated. No active bleeding after treatment. Saline applied.  Pt tolerated procedure well     Erika Bailon MD

## 2023-11-22 ENCOUNTER — OFFICE VISIT (OUTPATIENT)
Dept: FAMILY MEDICINE CLINIC | Facility: CLINIC | Age: 43
End: 2023-11-22

## 2023-11-22 ENCOUNTER — TELEPHONE (OUTPATIENT)
Age: 43
End: 2023-11-22

## 2023-11-22 VITALS
BODY MASS INDEX: 29.35 KG/M2 | HEART RATE: 91 BPM | HEIGHT: 70 IN | OXYGEN SATURATION: 97 % | SYSTOLIC BLOOD PRESSURE: 120 MMHG | DIASTOLIC BLOOD PRESSURE: 78 MMHG | TEMPERATURE: 97.7 F | WEIGHT: 205 LBS

## 2023-11-22 DIAGNOSIS — R04.0 RECURRENT EPISTAXIS: Primary | ICD-10-CM

## 2023-11-22 NOTE — TELEPHONE ENCOUNTER
Kath Lovett is calling with an update as requested by provider. He states that he had his nose cauterized in office yesterdays and was to call if he had any nose bleeds. Kath Lovett had 1 nose bleed last night around dinner time but it stopped rather quickly. No nose bleeds since. Please advise, thank you!

## 2023-11-22 NOTE — PROGRESS NOTES
Name: Marisabel Peguero      : 1980      MRN: [id number]  Encounter Provider: Howard Villar MD  Encounter Date: 2023   Encounter department: [address] [place] [address]. Recurrent epistaxis         Discussion:, When asked why you do not have geriatrics see Underwent treatment of anterior plexus blood vessels for recurrent epistasis. Patient had 1 blood vessel that remained enlarged and active. This was treated today. I reviewed posttreatment care. Follow-up 1 week as planned. Patient to call for problems or concerns in the interim  Subjective     Patient seen yesterday for left septal cauterization for recurrent epistasis. Patient did well but had recurrence last night after dinner. Bleeding was brief and easily controlled. Patient here for recheck. He denies any bleeding today. Review of Systems   Constitutional: Negative. HENT:  Positive for nosebleeds. Skin: Negative.         Past Medical History:   Diagnosis Date   • Allergic    • GERD (gastroesophageal reflux disease)    • High cholesterol    • Seasonal allergies     feels well today regarding allergies 22     Past Surgical History:   Procedure Laterality Date   • COLONOSCOPY     • UPPER GASTROINTESTINAL ENDOSCOPY       Family History   Problem Relation Age of Onset   • Heart disease Mother         cardiac disorder   • Hypertension Mother    • Heart attack Mother         prior    • Heart disease Father 39        CABG   • Asthma Brother      Social History     Socioeconomic History   • Marital status: /Civil Union     Spouse name: None   • Number of children: None   • Years of education: None   • Highest education level: None   Occupational History     Comment: full time employment    Tobacco Use   • Smoking status: Never   • Smokeless tobacco: Former     Types: Chew     Quit date: 2021   Vaping Use   • Vaping Use: Never used   Substance and Sexual Activity   • Alcohol use: Yes     Comment: seldom-beer   • Drug use: No   • Sexual activity: Yes     Partners: Female   Other Topics Concern   • None   Social History Narrative    Always uses seat belt    Works as     2 cups coffee/day     Social Determinants of Health     Financial Resource Strain: Not on file   Food Insecurity: Not on file   Transportation Needs: Not on file   Physical Activity: Not on file   Stress: Not on file   Social Connections: Not on file   Intimate Partner Violence: Not on file   Housing Stability: Not on file     Current Outpatient Medications on File Prior to Visit   Medication Sig   • atorvastatin (LIPITOR) 10 mg tablet TAKE 1 TABLET BY MOUTH EVERY DAY   • cetirizine (ZyrTEC) 5 MG tablet Take 5 mg by mouth daily   • Multiple Vitamins-Minerals (PRESERVISION AREDS PO) Take by mouth     Allergies   Allergen Reactions   • Bee Venom Swelling     At sting site   • Seasonal Ic [Cholestatin] Itching     Immunization History   Administered Date(s) Administered   • COVID-19 PFIZER VACCINE 0.3 ML IM 04/30/2021, 05/24/2021   • DT (pediatric) 08/16/1994   • MMR 08/16/1994   • OPV 03/30/1996       Objective     /78 (BP Location: Left arm, Patient Position: Sitting, Cuff Size: Adult)   Pulse 91   Temp 97.7 °F (36.5 °C)   Ht 5' 10" (1.778 m)   Wt 93 kg (205 lb)   SpO2 97%   BMI 29.41 kg/m²     Physical Exam  Vitals reviewed. HENT:      Head: Normocephalic. Nose:      Comments: Left septum status post recent cauterization. 1 small blood vessel noted in the anterior plexus with evidence of recent bleeding. Area was treated with silver nitrate. Saline was later applied. Patient tolerated procedure well  Neurological:      Mental Status: He is alert.        Lisa Pérez MD

## 2023-11-29 ENCOUNTER — OFFICE VISIT (OUTPATIENT)
Dept: FAMILY MEDICINE CLINIC | Facility: CLINIC | Age: 43
End: 2023-11-29

## 2023-11-29 VITALS
BODY MASS INDEX: 29.75 KG/M2 | OXYGEN SATURATION: 97 % | WEIGHT: 207.8 LBS | SYSTOLIC BLOOD PRESSURE: 120 MMHG | TEMPERATURE: 96.6 F | HEART RATE: 86 BPM | HEIGHT: 70 IN | DIASTOLIC BLOOD PRESSURE: 78 MMHG

## 2023-11-29 DIAGNOSIS — R04.0 RECURRENT EPISTAXIS: Primary | ICD-10-CM

## 2023-11-29 PROCEDURE — 99024 POSTOP FOLLOW-UP VISIT: CPT | Performed by: FAMILY MEDICINE

## 2023-11-29 NOTE — ASSESSMENT & PLAN NOTE
Vessels appear to be well healed. Will watch for now. Humidify air at home and other conservative measures advised.  Recheck prn

## 2023-11-29 NOTE — PROGRESS NOTES
Name: Maria Dolores Harvey      : 1980      MRN: [id number]  Encounter Provider: Sandip Barone MD  Encounter Date: 2023   Encounter department: [address] [place] [address]. Recurrent epistaxis  Assessment & Plan:  Vessels appear to be well healed. Will watch for now. Humidify air at home and other conservative measures advised. Recheck prn           Subjective      Pt here for recheck of epistaxis. Pt underwent cauterization of L septum on  with repeat to treat small area on . Since then, pt states that he has not had any further bleeds. He denies any side effects of treatment      Review of Systems   Constitutional: Negative. HENT: Negative. Current Outpatient Medications on File Prior to Visit   Medication Sig   • atorvastatin (LIPITOR) 10 mg tablet TAKE 1 TABLET BY MOUTH EVERY DAY   • cetirizine (ZyrTEC) 5 MG tablet Take 5 mg by mouth daily   • Multiple Vitamins-Minerals (PRESERVISION AREDS PO) Take by mouth       Objective     /78 (BP Location: Left arm, Patient Position: Sitting, Cuff Size: Large)   Pulse 86   Temp (!) 96.6 °F (35.9 °C)   Ht 5' 10" (1.778 m)   Wt 94.3 kg (207 lb 12.8 oz)   SpO2 97%   BMI 29.82 kg/m²     Physical Exam  Vitals reviewed. HENT:      Nose: No congestion. Comments: L septum without any appreciable dilated blood vessels. Treated area well healed. Neurological:      Mental Status: He is alert.        Sandip Barone MD

## 2023-12-18 DIAGNOSIS — E78.00 HYPERCHOLESTEROLEMIA: ICD-10-CM

## 2023-12-18 RX ORDER — ATORVASTATIN CALCIUM 10 MG/1
TABLET, FILM COATED ORAL
Qty: 90 TABLET | Refills: 1 | Status: SHIPPED | OUTPATIENT
Start: 2023-12-18

## 2024-04-01 ENCOUNTER — TELEPHONE (OUTPATIENT)
Age: 44
End: 2024-04-01

## 2024-04-01 NOTE — TELEPHONE ENCOUNTER
The patient called he has an appointment on   4/23/24   he would like a lab slip put in   please call him when it is done   thank you

## 2024-04-02 DIAGNOSIS — Z13.89 SCREENING FOR CARDIOVASCULAR, RESPIRATORY, AND GENITOURINARY DISEASES: Primary | ICD-10-CM

## 2024-04-02 DIAGNOSIS — Z13.83 SCREENING FOR CARDIOVASCULAR, RESPIRATORY, AND GENITOURINARY DISEASES: Primary | ICD-10-CM

## 2024-04-02 DIAGNOSIS — Z13.6 SCREENING FOR CARDIOVASCULAR, RESPIRATORY, AND GENITOURINARY DISEASES: Primary | ICD-10-CM

## 2024-04-18 ENCOUNTER — APPOINTMENT (OUTPATIENT)
Dept: LAB | Facility: MEDICAL CENTER | Age: 44
End: 2024-04-18
Payer: COMMERCIAL

## 2024-04-18 DIAGNOSIS — Z13.6 SCREENING FOR CARDIOVASCULAR, RESPIRATORY, AND GENITOURINARY DISEASES: ICD-10-CM

## 2024-04-18 DIAGNOSIS — Z13.89 SCREENING FOR CARDIOVASCULAR, RESPIRATORY, AND GENITOURINARY DISEASES: ICD-10-CM

## 2024-04-18 DIAGNOSIS — Z13.83 SCREENING FOR CARDIOVASCULAR, RESPIRATORY, AND GENITOURINARY DISEASES: ICD-10-CM

## 2024-04-18 LAB
ALBUMIN SERPL BCP-MCNC: 4.5 G/DL (ref 3.5–5)
ALP SERPL-CCNC: 52 U/L (ref 34–104)
ALT SERPL W P-5'-P-CCNC: 36 U/L (ref 7–52)
ANION GAP SERPL CALCULATED.3IONS-SCNC: 9 MMOL/L (ref 4–13)
AST SERPL W P-5'-P-CCNC: 24 U/L (ref 13–39)
BASOPHILS # BLD AUTO: 0.04 THOUSANDS/ÂΜL (ref 0–0.1)
BASOPHILS NFR BLD AUTO: 1 % (ref 0–1)
BILIRUB SERPL-MCNC: 0.72 MG/DL (ref 0.2–1)
BUN SERPL-MCNC: 10 MG/DL (ref 5–25)
CALCIUM SERPL-MCNC: 9.5 MG/DL (ref 8.4–10.2)
CHLORIDE SERPL-SCNC: 103 MMOL/L (ref 96–108)
CHOLEST SERPL-MCNC: 195 MG/DL
CO2 SERPL-SCNC: 26 MMOL/L (ref 21–32)
CREAT SERPL-MCNC: 0.81 MG/DL (ref 0.6–1.3)
EOSINOPHIL # BLD AUTO: 0.24 THOUSAND/ÂΜL (ref 0–0.61)
EOSINOPHIL NFR BLD AUTO: 4 % (ref 0–6)
ERYTHROCYTE [DISTWIDTH] IN BLOOD BY AUTOMATED COUNT: 12.5 % (ref 11.6–15.1)
GFR SERPL CREATININE-BSD FRML MDRD: 107 ML/MIN/1.73SQ M
GLUCOSE P FAST SERPL-MCNC: 94 MG/DL (ref 65–99)
HCT VFR BLD AUTO: 50.9 % (ref 36.5–49.3)
HDLC SERPL-MCNC: 31 MG/DL
HGB BLD-MCNC: 16.4 G/DL (ref 12–17)
IMM GRANULOCYTES # BLD AUTO: 0.01 THOUSAND/UL (ref 0–0.2)
IMM GRANULOCYTES NFR BLD AUTO: 0 % (ref 0–2)
LDLC SERPL CALC-MCNC: 114 MG/DL (ref 0–100)
LYMPHOCYTES # BLD AUTO: 2.32 THOUSANDS/ÂΜL (ref 0.6–4.47)
LYMPHOCYTES NFR BLD AUTO: 38 % (ref 14–44)
MCH RBC QN AUTO: 29 PG (ref 26.8–34.3)
MCHC RBC AUTO-ENTMCNC: 32.2 G/DL (ref 31.4–37.4)
MCV RBC AUTO: 90 FL (ref 82–98)
MONOCYTES # BLD AUTO: 0.71 THOUSAND/ÂΜL (ref 0.17–1.22)
MONOCYTES NFR BLD AUTO: 12 % (ref 4–12)
NEUTROPHILS # BLD AUTO: 2.77 THOUSANDS/ÂΜL (ref 1.85–7.62)
NEUTS SEG NFR BLD AUTO: 45 % (ref 43–75)
NONHDLC SERPL-MCNC: 164 MG/DL
NRBC BLD AUTO-RTO: 0 /100 WBCS
PLATELET # BLD AUTO: 309 THOUSANDS/UL (ref 149–390)
PMV BLD AUTO: 10 FL (ref 8.9–12.7)
POTASSIUM SERPL-SCNC: 4.7 MMOL/L (ref 3.5–5.3)
PROT SERPL-MCNC: 7.3 G/DL (ref 6.4–8.4)
RBC # BLD AUTO: 5.66 MILLION/UL (ref 3.88–5.62)
SODIUM SERPL-SCNC: 138 MMOL/L (ref 135–147)
TRIGL SERPL-MCNC: 248 MG/DL
WBC # BLD AUTO: 6.09 THOUSAND/UL (ref 4.31–10.16)

## 2024-04-18 PROCEDURE — 80061 LIPID PANEL: CPT

## 2024-04-18 PROCEDURE — 85025 COMPLETE CBC W/AUTO DIFF WBC: CPT

## 2024-04-18 PROCEDURE — 36415 COLL VENOUS BLD VENIPUNCTURE: CPT

## 2024-04-18 PROCEDURE — 80053 COMPREHEN METABOLIC PANEL: CPT

## 2024-04-23 ENCOUNTER — OFFICE VISIT (OUTPATIENT)
Dept: FAMILY MEDICINE CLINIC | Facility: CLINIC | Age: 44
End: 2024-04-23
Payer: COMMERCIAL

## 2024-04-23 VITALS
OXYGEN SATURATION: 99 % | SYSTOLIC BLOOD PRESSURE: 130 MMHG | WEIGHT: 208.4 LBS | TEMPERATURE: 97.8 F | HEIGHT: 70 IN | DIASTOLIC BLOOD PRESSURE: 84 MMHG | BODY MASS INDEX: 29.84 KG/M2 | HEART RATE: 78 BPM

## 2024-04-23 DIAGNOSIS — Z00.00 ANNUAL PHYSICAL EXAM: Primary | ICD-10-CM

## 2024-04-23 DIAGNOSIS — E78.00 HYPERCHOLESTEROLEMIA: ICD-10-CM

## 2024-04-23 PROCEDURE — 99396 PREV VISIT EST AGE 40-64: CPT | Performed by: FAMILY MEDICINE

## 2024-04-23 NOTE — PROGRESS NOTES
ADULT ANNUAL PHYSICAL  [facility]    NAME: Adolfo Magallon  AGE: 43 y.o. SEX: male  : 1980     DATE: 2024     Assessment and Plan:     Problem List Items Addressed This Visit        Other    Hypercholesterolemia   Other Visit Diagnoses     Annual physical exam    -  Primary            Immunizations and preventive care screenings were discussed with patient today. Appropriate education was printed on patient's after visit summary.      Counseling:  Exercise: the importance of regular exercise/physical activity was discussed. Recommend exercise 3-5 times per week for at least 30 minutes.          Return in about 1 year (around 2025).     Chief Complaint:     Chief Complaint   Patient presents with   • Annual Exam     About a month ago had episode of blurry vision but no new episodes   • Nevus     On back      History of Present Illness:     Adult Annual Physical   Patient here for a comprehensive physical exam. The patient reports no problems.    Diet and Physical Activity  Diet/Nutrition:  inconsistent .   Exercise: moderate cardiovascular exercise, 3-4 times a week on average, and 30-60 minutes on average.      Depression Screening  PHQ-2/9 Depression Screening    Little interest or pleasure in doing things: 0 - not at all  Feeling down, depressed, or hopeless: 0 - not at all  PHQ-2 Score: 0  PHQ-2 Interpretation: Negative depression screen       General Health  Sleep: sleeps well and gets 7-8 hours of sleep on average.   Hearing: normal - bilateral.  Vision: vision problems: one episode of brief vision change - bilat .   Dental: regular dental visits and brushes teeth twice daily.        Health  Symptoms include: none     Review of Systems:     Review of Systems   Constitutional: Negative.    HENT: Negative.     Eyes: Negative.    Respiratory: Negative.     Cardiovascular: Negative.    Gastrointestinal: Negative.    Endocrine: Negative.     Genitourinary: Negative.    Musculoskeletal: Negative.    Skin: Negative.    Allergic/Immunologic: Negative.    Neurological: Negative.    Hematological: Negative.    Psychiatric/Behavioral: Negative.        Past Medical History:     Past Medical History:   Diagnosis Date   • Allergic    • GERD (gastroesophageal reflux disease)    • High cholesterol    • Seasonal allergies     feels well today regarding allergies 2/7/22      Past Surgical History:     Past Surgical History:   Procedure Laterality Date   • COLONOSCOPY     • UPPER GASTROINTESTINAL ENDOSCOPY        Family History:     Family History   Problem Relation Age of Onset   • Heart disease Mother         cardiac disorder   • Hypertension Mother    • Heart attack Mother         prior    • Heart disease Father 36        CABG   • Asthma Brother       Social History:     Social History     Socioeconomic History   • Marital status: /Civil Union     Spouse name: None   • Number of children: None   • Years of education: None   • Highest education level: None   Occupational History     Comment: full time employment    Tobacco Use   • Smoking status: Never   • Smokeless tobacco: Former     Types: Snuff, Chew     Quit date: 12/13/2021   • Tobacco comments:     On and off user since late 20’s, longest period of use was 6 years.   Vaping Use   • Vaping status: Never Used   Substance and Sexual Activity   • Alcohol use: Yes     Comment: Social drinker,  1 to 2 drinks a month.   • Drug use: No   • Sexual activity: Yes     Partners: Female   Other Topics Concern   • None   Social History Narrative    Always uses seat belt    Works as     2 cups coffee/day     Social Determinants of Health     Financial Resource Strain: Not on file   Food Insecurity: Not on file   Transportation Needs: Not on file   Physical Activity: Not on file   Stress: Not on file   Social Connections: Not on file   Intimate Partner Violence: Not on file   Housing Stability: Not on  "file      Current Medications:     Current Outpatient Medications   Medication Sig Dispense Refill   • atorvastatin (LIPITOR) 10 mg tablet TAKE 1 TABLET BY MOUTH EVERY DAY 90 tablet 1   • cetirizine (ZyrTEC) 5 MG tablet Take 5 mg by mouth daily     • Multiple Vitamins-Minerals (PRESERVISION AREDS PO) Take by mouth       No current facility-administered medications for this visit.      Allergies:     Allergies   Allergen Reactions   • Bee Venom Swelling     At sting site   • Seasonal Ic [Cholestatin] Itching      Physical Exam:     /84 (BP Location: Left arm, Patient Position: Sitting, Cuff Size: Adult)   Pulse 78   Temp 97.8 °F (36.6 °C)   Ht 5' 10.25\" (1.784 m)   Wt 94.5 kg (208 lb 6.4 oz)   SpO2 99%   BMI 29.69 kg/m²     Physical Exam  Vitals reviewed.   Constitutional:       Appearance: He is well-developed.   HENT:      Head: Normocephalic and atraumatic.      Right Ear: Tympanic membrane, ear canal and external ear normal.      Left Ear: Ear canal and external ear normal.      Nose: Nose normal.      Mouth/Throat:      Mouth: Mucous membranes are moist.   Eyes:      Extraocular Movements: Extraocular movements intact.      Conjunctiva/sclera: Conjunctivae normal.      Pupils: Pupils are equal, round, and reactive to light.   Neck:      Thyroid: No thyromegaly.      Vascular: No JVD.   Cardiovascular:      Rate and Rhythm: Normal rate and regular rhythm.      Pulses: Normal pulses.      Heart sounds: Normal heart sounds. No murmur heard.  Pulmonary:      Effort: Pulmonary effort is normal. No respiratory distress.      Breath sounds: Normal breath sounds. No wheezing or rales.   Abdominal:      General: Bowel sounds are normal. There is no distension.      Palpations: Abdomen is soft. There is no mass.      Tenderness: There is no abdominal tenderness.   Musculoskeletal:         General: No swelling, tenderness or deformity. Normal range of motion.      Cervical back: Normal range of motion and neck " supple. No muscular tenderness.      Right lower leg: No edema.      Left lower leg: No edema.   Lymphadenopathy:      Cervical: No cervical adenopathy.   Skin:     General: Skin is warm.      Capillary Refill: Capillary refill takes less than 2 seconds.   Neurological:      Mental Status: He is alert and oriented to person, place, and time.      Cranial Nerves: No cranial nerve deficit.      Sensory: No sensory deficit.      Motor: No weakness or abnormal muscle tone.      Coordination: Coordination normal.      Gait: Gait normal.      Deep Tendon Reflexes: Reflexes normal.   Psychiatric:         Mood and Affect: Mood normal.         Behavior: Behavior normal.         Thought Content: Thought content normal.         Judgment: Judgment normal.          Michael Hoang MD  [facility]

## 2024-06-13 ENCOUNTER — HOSPITAL ENCOUNTER (EMERGENCY)
Facility: HOSPITAL | Age: 44
Discharge: HOME | End: 2024-06-13
Attending: EMERGENCY MEDICINE
Payer: COMMERCIAL

## 2024-06-13 ENCOUNTER — TELEPHONE (OUTPATIENT)
Dept: FAMILY MEDICINE CLINIC | Facility: CLINIC | Age: 44
End: 2024-06-13

## 2024-06-13 ENCOUNTER — NURSE TRIAGE (OUTPATIENT)
Dept: OTHER | Facility: OTHER | Age: 44
End: 2024-06-13

## 2024-06-13 VITALS
DIASTOLIC BLOOD PRESSURE: 87 MMHG | SYSTOLIC BLOOD PRESSURE: 141 MMHG | BODY MASS INDEX: 29.84 KG/M2 | TEMPERATURE: 97.8 F | WEIGHT: 209.44 LBS | RESPIRATION RATE: 19 BRPM | OXYGEN SATURATION: 99 % | HEART RATE: 74 BPM

## 2024-06-13 DIAGNOSIS — R07.9 CHEST PAIN: Primary | ICD-10-CM

## 2024-06-13 LAB
ALBUMIN SERPL BCP-MCNC: 4.8 G/DL (ref 3.5–5)
ALP SERPL-CCNC: 52 U/L (ref 34–104)
ALT SERPL W P-5'-P-CCNC: 31 U/L (ref 7–52)
ANION GAP SERPL CALCULATED.3IONS-SCNC: 7 MMOL/L (ref 4–13)
AST SERPL W P-5'-P-CCNC: 22 U/L (ref 13–39)
ATRIAL RATE: 66 BPM
BASOPHILS # BLD AUTO: 0.06 THOUSANDS/ÂΜL (ref 0–0.1)
BASOPHILS NFR BLD AUTO: 1 % (ref 0–1)
BILIRUB SERPL-MCNC: 0.85 MG/DL (ref 0.2–1)
BUN SERPL-MCNC: 14 MG/DL (ref 5–25)
CALCIUM SERPL-MCNC: 9.6 MG/DL (ref 8.4–10.2)
CARDIAC TROPONIN I PNL SERPL HS: 4 NG/L
CHLORIDE SERPL-SCNC: 106 MMOL/L (ref 96–108)
CO2 SERPL-SCNC: 26 MMOL/L (ref 21–32)
CREAT SERPL-MCNC: 0.83 MG/DL (ref 0.6–1.3)
EOSINOPHIL # BLD AUTO: 0.27 THOUSAND/ÂΜL (ref 0–0.61)
EOSINOPHIL NFR BLD AUTO: 4 % (ref 0–6)
ERYTHROCYTE [DISTWIDTH] IN BLOOD BY AUTOMATED COUNT: 12.6 % (ref 11.6–15.1)
GFR SERPL CREATININE-BSD FRML MDRD: 106 ML/MIN/1.73SQ M
GLUCOSE SERPL-MCNC: 96 MG/DL (ref 65–140)
HCT VFR BLD AUTO: 48.1 % (ref 36.5–49.3)
HGB BLD-MCNC: 15.8 G/DL (ref 12–17)
IMM GRANULOCYTES # BLD AUTO: 0.01 THOUSAND/UL (ref 0–0.2)
IMM GRANULOCYTES NFR BLD AUTO: 0 % (ref 0–2)
LYMPHOCYTES # BLD AUTO: 2.66 THOUSANDS/ÂΜL (ref 0.6–4.47)
LYMPHOCYTES NFR BLD AUTO: 42 % (ref 14–44)
MCH RBC QN AUTO: 29.2 PG (ref 26.8–34.3)
MCHC RBC AUTO-ENTMCNC: 32.8 G/DL (ref 31.4–37.4)
MCV RBC AUTO: 89 FL (ref 82–98)
MONOCYTES # BLD AUTO: 0.74 THOUSAND/ÂΜL (ref 0.17–1.22)
MONOCYTES NFR BLD AUTO: 12 % (ref 4–12)
NEUTROPHILS # BLD AUTO: 2.57 THOUSANDS/ÂΜL (ref 1.85–7.62)
NEUTS SEG NFR BLD AUTO: 41 % (ref 43–75)
NRBC BLD AUTO-RTO: 0 /100 WBCS
P AXIS: 35 DEGREES
PLATELET # BLD AUTO: 288 THOUSANDS/UL (ref 149–390)
PMV BLD AUTO: 9.4 FL (ref 8.9–12.7)
POTASSIUM SERPL-SCNC: 4.4 MMOL/L (ref 3.5–5.3)
PR INTERVAL: 160 MS
PROT SERPL-MCNC: 7.7 G/DL (ref 6.4–8.4)
QRS AXIS: 111 DEGREES
QRSD INTERVAL: 92 MS
QT INTERVAL: 386 MS
QTC INTERVAL: 404 MS
RBC # BLD AUTO: 5.41 MILLION/UL (ref 3.88–5.62)
SODIUM SERPL-SCNC: 139 MMOL/L (ref 135–147)
T WAVE AXIS: 30 DEGREES
VENTRICULAR RATE: 66 BPM
WBC # BLD AUTO: 6.31 THOUSAND/UL (ref 4.31–10.16)

## 2024-06-13 PROCEDURE — 99285 EMERGENCY DEPT VISIT HI MDM: CPT

## 2024-06-13 PROCEDURE — 36415 COLL VENOUS BLD VENIPUNCTURE: CPT

## 2024-06-13 PROCEDURE — 93005 ELECTROCARDIOGRAM TRACING: CPT

## 2024-06-13 PROCEDURE — 93010 ELECTROCARDIOGRAM REPORT: CPT | Performed by: INTERNAL MEDICINE

## 2024-06-13 PROCEDURE — 80053 COMPREHEN METABOLIC PANEL: CPT | Performed by: EMERGENCY MEDICINE

## 2024-06-13 PROCEDURE — 85025 COMPLETE CBC W/AUTO DIFF WBC: CPT | Performed by: EMERGENCY MEDICINE

## 2024-06-13 PROCEDURE — 99285 EMERGENCY DEPT VISIT HI MDM: CPT | Performed by: EMERGENCY MEDICINE

## 2024-06-13 PROCEDURE — 84484 ASSAY OF TROPONIN QUANT: CPT | Performed by: EMERGENCY MEDICINE

## 2024-06-13 NOTE — TELEPHONE ENCOUNTER
"Reason for Disposition  • All other patients with chest pain (Exception: fleeting chest pain lasting a few seconds)    Answer Assessment - Initial Assessment Questions  1. LOCATION: \"Where does it hurt?\"        Towards the left side in a chest   2. RADIATION: \"Does the pain go anywhere else?\" (e.g., into neck, jaw, arms, back)      No   3. ONSET: \"When did the chest pain begin?\" (Minutes, hours or days)       A week ago   4. PATTERN \"Does the pain come and go, or has it been constant since it started?\"  \"Does it get worse with exertion?\"       Intermittent   5. DURATION: \"How long does it last\" (e.g., seconds, minutes, hours)      Last night pain was going on for 40 minutes.   6. SEVERITY: \"How bad is the pain?\"  (e.g., Scale 1-10; mild, moderate, or severe)     - MILD (1-3): doesn't interfere with normal activities      - MODERATE (4-7): interferes with normal activities or awakens from sleep     - SEVERE (8-10): excruciating pain, unable to do any normal activities        Moderate 6 out of 10   7. CARDIAC RISK FACTORS: \"Do you have any history of heart problems or risk factors for heart disease?\" (e.g., angina, prior heart attack; diabetes, high blood pressure, high cholesterol, smoker, or strong family history of heart disease)      Strong family history   8. PULMONARY RISK FACTORS: \"Do you have any history of lung disease?\"  (e.g., blood clots in lung, asthma, emphysema, birth control pills)      No   9. CAUSE: \"What do you think is causing the chest pain?\"      Unknown   10. OTHER SYMPTOMS: \"Do you have any other symptoms?\" (e.g., dizziness, nausea, vomiting, sweating, fever, difficulty breathing, cough)        No    Protocols used: Chest Pain-ADULT-[place]    "

## 2024-06-13 NOTE — ED PROVIDER NOTES
"History  Chief Complaint   Patient presents with    Chest Pain     PT presents with \"on and off CP after a round of golf last week, noticed that after I eat it gets worse as well, but if I push and rub on a spot on the left side of my chest it goes away\"     43-year-old male comes in for evaluation of chest pain.  Patient states that he was out golfing last week when he noticed after it he began to have pain over his breastbone.  Patient states he has had the pain intermittently over the last week he finds sometimes aggravated when he eats too much or if he touches the area.  Patient is also a  and notices if he is wearing his vest if it is too tight it bothers the same area.  It is localized to his chest in the same spot it does not radiate anywhere.  Patient denies any shortness of breath.  Patient states he was able to go to for an extensive mountain bike ride 2 days ago and he had no chest pain or shortness of breath with that.  Patient is concerned because both his mother and father have a history of cardiac problems.  Also has a history of high cholesterol for which she has been on Lipitor for since he was in his 20s.  Patient is currently pain-free.      History provided by:  Patient   used: No    Chest Pain  Pain location:  Substernal area  Pain quality: aching and dull    Pain radiates to:  Does not radiate  Pain severity:  Moderate  Onset quality:  Sudden  Duration:  1 week  Timing:  Intermittent  Progression:  Resolved  Chronicity:  New  Context: movement and raising an arm    Relieved by:  Rest and certain positions  Ineffective treatments:  Antacids  Associated symptoms: no abdominal pain, no anorexia, no anxiety, no back pain, no cough, no diaphoresis, no fever, no orthopnea, no palpitations, no shortness of breath and not vomiting    Risk factors: no high cholesterol and not male        Prior to Admission Medications   Prescriptions Last Dose Informant Patient " Reported? Taking?   Multiple Vitamins-Minerals (PRESERVISION AREDS PO)  Self Yes No   Sig: Take by mouth   atorvastatin (LIPITOR) 10 mg tablet   No No   Sig: TAKE 1 TABLET BY MOUTH EVERY DAY   cetirizine (ZyrTEC) 5 MG tablet  Self Yes No   Sig: Take 5 mg by mouth daily      Facility-Administered Medications: None       Past Medical History:   Diagnosis Date    Allergic     GERD (gastroesophageal reflux disease)     High cholesterol     Seasonal allergies     feels well today regarding allergies 2/7/22       Past Surgical History:   Procedure Laterality Date    COLONOSCOPY      UPPER GASTROINTESTINAL ENDOSCOPY         Family History   Problem Relation Age of Onset    Heart disease Mother         cardiac disorder    Hypertension Mother     Heart attack Mother         prior     Heart disease Father 36        CABG    Asthma Brother      I have reviewed and agree with the history as documented.    E-Cigarette/Vaping    E-Cigarette Use Never User      E-Cigarette/Vaping Substances    Nicotine No     THC No     CBD No     Flavoring No     Other No     Unknown No      Social History     Tobacco Use    Smoking status: Never    Smokeless tobacco: Former     Types: Snuff, Chew     Quit date: 12/13/2021    Tobacco comments:     On and off user since late 20’s, longest period of use was 6 years.   Vaping Use    Vaping status: Never Used   Substance Use Topics    Alcohol use: Yes     Comment: Social drinker,  1 to 2 drinks a month.    Drug use: No       Review of Systems   Constitutional:  Negative for chills, diaphoresis and fever.   HENT:  Negative for ear pain and sore throat.    Eyes:  Negative for pain and visual disturbance.   Respiratory:  Negative for cough and shortness of breath.    Cardiovascular:  Positive for chest pain. Negative for palpitations and orthopnea.   Gastrointestinal:  Negative for abdominal pain, anorexia and vomiting.   Genitourinary:  Negative for dysuria and hematuria.   Musculoskeletal:  Negative  for arthralgias and back pain.   Skin:  Negative for color change and rash.   Neurological:  Negative for seizures and syncope.   All other systems reviewed and are negative.      Physical Exam  Physical Exam  Vitals and nursing note reviewed.   Constitutional:       General: He is not in acute distress.     Appearance: He is well-developed.   HENT:      Head: Normocephalic and atraumatic.   Eyes:      Conjunctiva/sclera: Conjunctivae normal.   Cardiovascular:      Rate and Rhythm: Normal rate and regular rhythm.      Heart sounds: No murmur heard.  Pulmonary:      Effort: Pulmonary effort is normal. No respiratory distress.      Breath sounds: Normal breath sounds.   Abdominal:      Palpations: Abdomen is soft.      Tenderness: There is no abdominal tenderness.   Musculoskeletal:         General: No swelling.      Cervical back: Neck supple.   Skin:     General: Skin is warm and dry.      Capillary Refill: Capillary refill takes less than 2 seconds.   Neurological:      Mental Status: He is alert.   Psychiatric:         Mood and Affect: Mood normal.         Vital Signs  ED Triage Vitals [06/13/24 1022]   Temperature Pulse Respirations Blood Pressure SpO2   97.8 °F (36.6 °C) 74 19 141/87 99 %      Temp Source Heart Rate Source Patient Position - Orthostatic VS BP Location FiO2 (%)   Oral Monitor Lying Left arm --      Pain Score       2           Vitals:    06/13/24 1022   BP: 141/87   Pulse: 74   Patient Position - Orthostatic VS: Lying         Visual Acuity      ED Medications  Medications - No data to display    Diagnostic Studies  Results Reviewed       Procedure Component Value Units Date/Time    HS Troponin 0hr (reflex protocol) [[id number]]  (Normal) Collected: 06/13/24 1027    Lab Status: Final result Specimen: Blood from Arm, Right Updated: 06/13/24 1057     hs TnI 0hr 4 ng/L     HS Troponin I 2hr [[id number]]     Lab Status: No result Specimen: Blood     Comprehensive metabolic panel [[id number]] Collected:  06/13/24 1027    Lab Status: Final result Specimen: Blood from Arm, Right Updated: 06/13/24 1056     Sodium 139 mmol/L      Potassium 4.4 mmol/L      Chloride 106 mmol/L      CO2 26 mmol/L      ANION GAP 7 mmol/L      BUN 14 mg/dL      Creatinine 0.83 mg/dL      Glucose 96 mg/dL      Calcium 9.6 mg/dL      AST 22 U/L      ALT 31 U/L      Alkaline Phosphatase 52 U/L      Total Protein 7.7 g/dL      Albumin 4.8 g/dL      Total Bilirubin 0.85 mg/dL      eGFR 106 ml/min/1.73sq m     Narrative:      National Kidney Disease Foundation guidelines for Chronic Kidney Disease (CKD):     Stage 1 with normal or high GFR (GFR > 90 mL/min/1.73 square meters)    Stage 2 Mild CKD (GFR = 60-89 mL/min/1.73 square meters)    Stage 3A Moderate CKD (GFR = 45-59 mL/min/1.73 square meters)    Stage 3B Moderate CKD (GFR = 30-44 mL/min/1.73 square meters)    Stage 4 Severe CKD (GFR = 15-29 mL/min/1.73 square meters)    Stage 5 End Stage CKD (GFR <15 mL/min/1.73 square meters)  Note: GFR calculation is accurate only with a steady state creatinine    CBC and differential [[id number]]  (Abnormal) Collected: 06/13/24 1027    Lab Status: Final result Specimen: Blood from Arm, Right Updated: 06/13/24 1038     WBC 6.31 Thousand/uL      RBC 5.41 Million/uL      Hemoglobin 15.8 g/dL      Hematocrit 48.1 %      MCV 89 fL      MCH 29.2 pg      MCHC 32.8 g/dL      RDW 12.6 %      MPV 9.4 fL      Platelets 288 Thousands/uL      nRBC 0 /100 WBCs      Segmented % 41 %      Immature Grans % 0 %      Lymphocytes % 42 %      Monocytes % 12 %      Eosinophils Relative 4 %      Basophils Relative 1 %      Absolute Neutrophils 2.57 Thousands/µL      Absolute Immature Grans 0.01 Thousand/uL      Absolute Lymphocytes 2.66 Thousands/µL      Absolute Monocytes 0.74 Thousand/µL      Eosinophils Absolute 0.27 Thousand/µL      Basophils Absolute 0.06 Thousands/µL                    No orders to display              Procedures  ECG 12 Lead Documentation  Only    Date/Time: 6/13/2024 10:25 AM    Performed by: Kathy Dunbar DO  Authorized by: Kathy Dunbar DO    Patient location:  ED  Rate:     ECG rate:  66  Rhythm:     Rhythm: sinus rhythm    Ectopy:     Ectopy: none    QRS:     QRS axis:  Normal           ED Course             HEART Risk Score      Flowsheet Row Most Recent Value   Heart Score Risk Calculator    History 1 Filed at: 06/13/2024 1130   ECG 1 Filed at: 06/13/2024 1130   Age 0 Filed at: 06/13/2024 1130   Risk Factors 2 Filed at: 06/13/2024 1130   Troponin 0 Filed at: 06/13/2024 1130   HEART Score 4 Filed at: 06/13/2024 1130                          SBIRT 22yo+      Flowsheet Row Most Recent Value   Initial Alcohol Screen: US AUDIT-C     1. How often do you have a drink containing alcohol? 0 Filed at: 06/13/2024 1034   2. How many drinks containing alcohol do you have on a typical day you are drinking?  0 Filed at: 06/13/2024 1034   3a. Male UNDER 65: How often do you have five or more drinks on one occasion? 0 Filed at: 06/13/2024 1034   Audit-C Score 0 Filed at: 06/13/2024 1034   RUBINA: How many times in the past year have you...    Used an illegal drug or used a prescription medication for non-medical reasons? Never Filed at: 06/13/2024 1034                      Medical Decision Making  Differential diagnosis includes but is not limited to musculoskeletal pain, GERD, ACS, unstable angina, costochondritis,    Problems Addressed:  Chest pain: acute illness or injury    Amount and/or Complexity of Data Reviewed  Labs: ordered. Decision-making details documented in ED Course.  ECG/medicine tests: ordered and independent interpretation performed. Decision-making details documented in ED Course.             Disposition  Final diagnoses:   Chest pain     Time reflects when diagnosis was documented in both MDM as applicable and the Disposition within this note       Time User Action Codes Description Comment    6/13/2024 11:30 AM Kathy Dunbar  Add [R07.9] Chest pain           ED Disposition       ED Disposition   Discharge    Condition   Stable    Date/Time   Thu Jun 13, 2024 1130    Comment   Adolfo Magallon discharge to home/self care.                   Follow-up Information       Follow up With Specialties Details Why Contact Info Additional Information    Michael Hoang MD Family Medicine Schedule an appointment as soon as possible for a visit   [address]  [address]  [facility] [address] [phone]       [facility] Cardiology [facility] Cardiology Schedule an appointment as soon as possible for a visit   [address]  [address]  [facility] [id number] [phone] [facility], [address], [place], [place], [address] [phone]            Discharge Medication List as of 6/13/2024 11:31 AM        CONTINUE these medications which have NOT CHANGED    Details   atorvastatin (LIPITOR) 10 mg tablet TAKE 1 TABLET BY MOUTH EVERY DAY, Normal      cetirizine (ZyrTEC) 5 MG tablet Take 5 mg by mouth daily, Historical Med      Multiple Vitamins-Minerals (PRESERVISION AREDS PO) Take by mouth, Historical Med                 PDMP Review       None            ED Provider  Electronically Signed by             Kathy Dunbar DO  06/13/24 1394

## 2024-06-13 NOTE — TELEPHONE ENCOUNTER
Patient has chest pains on and off for awhile now.   There is a family history, however, he is not sure if it is just muscles hurting at this point and is unsure what to do.  He had the pain last night, but does not have it now.

## 2024-06-18 DIAGNOSIS — E78.00 HYPERCHOLESTEROLEMIA: ICD-10-CM

## 2024-06-18 RX ORDER — ATORVASTATIN CALCIUM 10 MG/1
TABLET, FILM COATED ORAL
Qty: 90 TABLET | Refills: 1 | Status: SHIPPED | OUTPATIENT
Start: 2024-06-18

## 2024-08-19 ENCOUNTER — OFFICE VISIT (OUTPATIENT)
Dept: CARDIOLOGY CLINIC | Facility: MEDICAL CENTER | Age: 44
End: 2024-08-19
Payer: COMMERCIAL

## 2024-08-19 VITALS
WEIGHT: 207 LBS | BODY MASS INDEX: 29.63 KG/M2 | HEART RATE: 81 BPM | OXYGEN SATURATION: 98 % | SYSTOLIC BLOOD PRESSURE: 132 MMHG | DIASTOLIC BLOOD PRESSURE: 82 MMHG | HEIGHT: 70 IN

## 2024-08-19 DIAGNOSIS — R07.9 CHEST PAIN: ICD-10-CM

## 2024-08-19 DIAGNOSIS — E78.1 ESSENTIAL HYPERTRIGLYCERIDEMIA: ICD-10-CM

## 2024-08-19 DIAGNOSIS — R07.2 PRECORDIAL PAIN: Primary | ICD-10-CM

## 2024-08-19 DIAGNOSIS — E78.00 HYPERCHOLESTEROLEMIA: ICD-10-CM

## 2024-08-19 PROCEDURE — 99204 OFFICE O/P NEW MOD 45 MIN: CPT | Performed by: INTERNAL MEDICINE

## 2024-08-19 NOTE — PATIENT INSTRUCTIONS
Stress testing planned for further cardiac risk assessment.  Lp a level  Cardiac CT Calcium Score:  A CT calcium score exam, also known as a coronary calcium scan, is a quick, convenient and noninvasive way of evaluating the amount of calcified (hard) plaque in your heart vessels. The level of calcium equates to the extent of plaque build-up in your arteries. Plaque in the arteries can cause heart attacks.    The radiologist reads the images and sends your cardiologist a report with a calcium score. Patients with higher scores have a greater risk for a heart attack, heart disease or stroke. Knowing your score can help us decide on blood pressure and cholesterol goals that will minimize your risk as much as possible.    The American College of Cardiology found that Coronary artery calcification (CAC) is an excellent cardiovascular disease risk marker and can help guide the decision to use cholesterol reducing medications such as statins. A negative calcium score may reduce the need for statins in otherwise eligible patients. Knowing your score could save your life.    The exam takes less than 10 minutes, is painless and does not require any IV or oral contrast. The exam is typically not covered by insurance. The fee at [facility] radiology locations is $99.      Heart healthy nutrition:    Avoid cholesterol rich foods such as egg yolk, red meat, high fat dairy and fried food.        Mediterranean diet: A heart-healthy eating plan    What is the Mediterranean diet?  The Mediterranean diet is a way of eating based on the traditional cuisine of countries bordering the [place] Sea. While there is no single definition of the Mediterranean diet, it is typically high in vegetables, fruits, whole grains, beans, nut and seeds, and olive oil.    The main components of Mediterranean diet include:    Daily consumption of vegetables, fruits, whole grains and healthy fats.  Avoid cholesterol rich foods such as egg yolks, red  "meat, high fat dairy and fried food.  Limited intake of animal-based food.  Other important elements of the Mediterranean diet are sharing meals with family and friends    Plant based, not meat based  The foundation of the Mediterranean diet is vegetables, fruits, herbs, nuts, beans and whole grains. Meals are built around these plant-based foods.     Healthy fats  Healthy fats are a mainstay of the Mediterranean diet. They're eaten instead of less healthy fats, such as saturated and trans fats, which contribute to heart disease.    Olive oil is the primary source of added fat in the Mediterranean diet. Olive oil provides monounsaturated fat, which has been found to lower total cholesterol and low-density lipoprotein (LDL or \"bad\") cholesterol levels. Nuts and seeds also contain monounsaturated fat.    Fish are also important in the Mediterranean diet. Fatty fish -- such as mackerel, herring, sardines, albacore tuna, salmon and lake trout -- are rich in omega-3 fatty acids, a type of polyunsaturated fat that may reduce inflammation in the body. Omega-3 fatty acids also help decrease triglycerides, reduce blood clotting, and decrease the risk of stroke and heart failure.    Eating the Mediterranean way  Interested in trying the Mediterranean diet? These tips will help you get started:    Eat more fruits and vegetables. Aim for 7 to 10 servings a day of fruit and vegetables.  Opt for whole grains. Switch to whole-grain bread, cereal and pasta. [place] with other whole grains.  Use healthy fats. Try olive oil as a replacement for butter when cooking. Instead of putting butter or margarine on bread, try dipping it in flavored olive oil.  Eat more seafood. Eat fish twice a week. Fresh or water-packed tuna, salmon, trout, mackerel and herring are healthy choices. Grilled fish tastes good and requires little cleanup. Avoid deep-fried fish.  Avoid red meat.   Spice it up. Herbs and spices boost flavor and lessen the need " for salt.    Source: https://[email or web address]/healthy-lifestyle/nutrition-and-healthy-eating/in-depth/mediterranean-diet/art-98753750

## 2024-08-19 NOTE — LETTER
2024     Michael Hoang MD  [address] [facility].  [address]  [facility] [address]    Patient: Adolfo Magallon   YOB: 1980   Date of Visit: 2024       Dear Dr. Hoang:    Thank you for referring Adolfo Magallon to me for evaluation. Below are my notes for this consultation.    If you have questions, please do not hesitate to call me. I look forward to following your patient along with you.         Sincerely,        Gus Pillai MD        CC: DO Gus Geronimo MD  2024  2:07 PM  Incomplete  [facility] [place] [address]  [address]  [facility] [address]  Phone#  [phone]  Fax#  [phone]  [facility] Cardiology Office Consultation             NAME: Adolfo Magallon  AGE: 44 y.o. SEX: male   : 1980   MRN: [id number]    DATE: 2024  TIME: 2:07 PM    Cardiology Problem list:  Family history of CAD (Father had MI at age 34, mother in 50s), both have had CABG  Dyslipidemia  Chest pain: Left anterior fascicular block on EKG    Assessment and recommendations    Precordial pain  Overall ASCVD risk about 5%.  Recent ER visit for chest pain.  Longstanding history of dyslipidemia and family history of premature CAD.  Further risk stratification planned with exercise treadmill stress test and calcium scoring.  Continue current statin therapy.  If calcium score abnormal, intensify statins.    Hypercholesterolemia  Recent labs reviewed.  LDL currently 114, HDL 31, triglycerides 248.  LFTs normal.  Diet and lifestyle modification advised.  Regular exercise will be helpful to elevate the HDL.  Coronary artery calcium scoring to guide further intensification of statin therapy.  His BMI is 29.7.  He is keen on losing weight.  Discussed calorie restriction and regular exercise to achieve negative caloric balance.  Mediterranean style diet advised.    Essential hypertriglyceridemia  Last triglycerides were 248.  Primary approach will be  dietary modification.        Chief Complaint   Patient presents with   • New Patient Visit     Referred to after ER Visit for Chest Pains       HPI:    Adolfo Magallon is a 44 y.o.-year-old male who presents to the cardiology clinic for initial consultation.  He has been referred from the emergency room.    He was seen in the emergency room on 6/13/2024 with chest pain after having played a round of golf a few days prior.  He reported substernal chest pain that persisted over  few days before ER presentation that worsened after heavy meals.  His pain  also was aggravated by touch.  ER visit unremarkable.  He has longstanding history of hyperlipidemia for which she has been on statins.  He has a family history of cardiac problems.  ER workup reviewed.  Cardiac enzymes were normal.  EKG was normal.  Outpatient cardiology follow-up was advised.  Since that visit he has had no significant exertional chest pain.  Prior EKG personally reviewed: This is abnormal showing sinus rhythm with left posterior fascicular block and poor R wave progression.  No prior EKG before this 1 available.  He has a very strong family history of coronary artery disease.  Father had first MI at age 34 and mother had first MI in her 50s.  Both ended up having bypass surgery.  Father was a smoker.  Mother did not smoke.    Past history, family history, social history, current medications, vital signs, recent lab and imaging studies and  prior cardiology studies reviewed independently on this visit.    Allergies   Allergen Reactions   • Bee Venom Swelling     At sting site   • Seasonal Ic [Cholestatin] Itching       Current Outpatient Medications:   •  atorvastatin (LIPITOR) 10 mg tablet, TAKE 1 TABLET BY MOUTH EVERY DAY, Disp: 90 tablet, Rfl: 1  •  cetirizine (ZyrTEC) 5 MG tablet, Take 5 mg by mouth daily, Disp: , Rfl:   •  Multiple Vitamins-Minerals (PRESERVISION AREDS PO), Take by mouth, Disp: , Rfl:     Past Medical History:   Diagnosis Date   •  Allergic    • GERD (gastroesophageal reflux disease)    • High cholesterol    • Seasonal allergies     feels well today regarding allergies 2/7/22     Past Surgical History:   Procedure Laterality Date   • COLONOSCOPY     • UPPER GASTROINTESTINAL ENDOSCOPY       Family History   Problem Relation Age of Onset   • Heart disease Mother         cardiac disorder   • Hypertension Mother    • Heart attack Mother         prior    • Heart disease Father 36        CABG   • Asthma Brother      Social History   reports that he has never smoked. He quit smokeless tobacco use about 2 years ago.  His smokeless tobacco use included snuff and chew. He reports current alcohol use. He reports that he does not use drugs.     Review of Systems   Constitutional:  Negative for fatigue.   HENT: Negative.     Eyes: Negative.    Respiratory:  Positive for chest tightness. Negative for shortness of breath.    Cardiovascular:  Positive for chest pain. Negative for palpitations and leg swelling.   Gastrointestinal: Negative.    Endocrine: Negative.    Neurological:  Negative for syncope.   Hematological: Negative.    All other systems reviewed and are negative.      Objective:     Vitals:    08/19/24 1341   BP: 132/82   Pulse: 81   SpO2: 98%     Physical Exam  Vitals reviewed.   Constitutional:       General: He is not in acute distress.  HENT:      Head: Normocephalic.   Neck:      Vascular: No carotid bruit.   Cardiovascular:      Rate and Rhythm: Normal rate and regular rhythm.      Heart sounds: S1 normal and S2 normal. No murmur heard.  Pulmonary:      Breath sounds: No wheezing or rhonchi.   Musculoskeletal:      Right lower leg: No edema.      Left lower leg: No edema.   Skin:     General: Skin is warm.   Neurological:      General: No focal deficit present.      Mental Status: He is alert.   Psychiatric:         Mood and Affect: Mood normal.         Pertinent Laboratory/Diagnostic Studies:    Laboratory studies reviewed personally by  "Gus Pillai MD    BMP:   Lab Results   Component Value Date    SODIUM 139 2024    K 4.4 2024     2024    CO2 26 2024    BUN 14 2024    CREATININE 0.83 2024    GLUC 96 2024    CALCIUM 9.6 2024     CBC:  Lab Results   Component Value Date    WBC 6.31 2024    HGB 15.8 2024    HCT 48.1 2024    MCV 89 2024     2024     Lipid Profile:   Lab Results   Component Value Date    HDL 31 (L) 2024     Lab Results   Component Value Date    LDLCALC 114 (H) 2024     Lab Results   Component Value Date    TRIG 248 (H) 2024      Other labs:  Lab Results   Component Value Date    SFR4VEPVOZXV 1.310 2020     Lab Results   Component Value Date    ALT 31 2024    AST 22 2024       Imaging Studies:     Pertinent cardiac studies and imaging studies were personally reviewed on this visit and results summarized.        Portions of the record may have been created with voice recognition software.  Occasional wrong word or \"sound alike\" substitutions may have occurred due to the inherent limitations of voice recognition software.  Read the chart carefully and recognize, using context, where substitutions have occurred. Please reach out to me directly for any clarifications.    Gus Pillai MD  2024  2:03 PM  Sign when Signing Visit  [facility] [address] [address]  Phone#  [phone]  Fax#  [phone]  [facility] Cardiology Office Consultation             NAME: Adolfo Magallon  AGE: 44 y.o. SEX: male   : 1980   MRN: [id number]    DATE: 2024  TIME: 1:53 PM    Cardiology Problem list:  Family history of CAD (Father had MI at age 34, mother in 50s), both have had CABG  Dyslipidemia  Chest pain    Assessment and recommendations    Precordial pain  Overall ASCVD risk about 5%.  Recent ER visit for chest pain.  Longstanding history of dyslipidemia " and family history of premature CAD.  Further risk ratification planned with exercise treadmill stress test and calcium scoring.  Continue current statin therapy.    Hypercholesterolemia  Recent labs reviewed.  LDL currently 114, HDL 31, triglycerides 248.  LFTs normal.  Diet and lifestyle modification advised.  Regular exercise will be helpful to elevate the HDL.  Coronary artery calcium scoring to guide further intensification of statin therapy.    Essential hypertriglyceridemia  Last triglycerides were 248.  Primary approach will be dietary modification.        Chief Complaint   Patient presents with   • New Patient Visit     Referred to after ER Visit for Chest Pains       HPI:    Adolfo Magallon is a 44 y.o.-year-old male who presents to the cardiology clinic for initial consultation.  He has been referred from the emergency room.    He was seen in the emergency room on 6/13/2024 with chest pain after having played a round of golf a few days prior.  He reported substernal chest pain that persisted over  few days before ER presentation that worsened after heavy meals.  His pain  also was aggravated by touch.  ER visit unremarkable.  He has longstanding history of hyperlipidemia for which she has been on statins.  He has a family history of cardiac problems.  ER workup reviewed.  Cardiac enzymes were normal.  EKG was normal.  Outpatient cardiology follow-up was advised.  Prior EKG personally reviewed: This is abnormal showing sinus rhythm with left posterior fascicular block and poor R wave progression.  No prior EKG before this 1 available.    Past history, family history, social history, current medications, vital signs, recent lab and imaging studies and  prior cardiology studies reviewed independently on this visit.    Allergies   Allergen Reactions   • Bee Venom Swelling     At sting site   • Seasonal Ic [Cholestatin] Itching       Current Outpatient Medications:   •  atorvastatin (LIPITOR) 10 mg tablet, TAKE 1  TABLET BY MOUTH EVERY DAY, Disp: 90 tablet, Rfl: 1  •  cetirizine (ZyrTEC) 5 MG tablet, Take 5 mg by mouth daily, Disp: , Rfl:   •  Multiple Vitamins-Minerals (PRESERVISION AREDS PO), Take by mouth, Disp: , Rfl:     Past Medical History:   Diagnosis Date   • Allergic    • GERD (gastroesophageal reflux disease)    • High cholesterol    • Seasonal allergies     feels well today regarding allergies 2/7/22     Past Surgical History:   Procedure Laterality Date   • COLONOSCOPY     • UPPER GASTROINTESTINAL ENDOSCOPY       Family History   Problem Relation Age of Onset   • Heart disease Mother         cardiac disorder   • Hypertension Mother    • Heart attack Mother         prior    • Heart disease Father 36        CABG   • Asthma Brother      Social History   reports that he has never smoked. He quit smokeless tobacco use about 2 years ago.  His smokeless tobacco use included snuff and chew. He reports current alcohol use. He reports that he does not use drugs.     Review of Systems   Constitutional:  Negative for fatigue.   HENT: Negative.     Eyes: Negative.    Respiratory:  Positive for chest tightness. Negative for shortness of breath.    Cardiovascular:  Positive for chest pain. Negative for palpitations and leg swelling.   Gastrointestinal: Negative.    Endocrine: Negative.    Neurological:  Negative for syncope.   Hematological: Negative.    All other systems reviewed and are negative.      Objective:     Vitals:    08/19/24 1341   BP: 132/82   Pulse: 81   SpO2: 98%     Physical Exam  Vitals reviewed.   Constitutional:       General: He is not in acute distress.  HENT:      Head: Normocephalic.   Neck:      Vascular: No carotid bruit.   Cardiovascular:      Rate and Rhythm: Normal rate and regular rhythm.      Heart sounds: S1 normal and S2 normal. No murmur heard.  Pulmonary:      Breath sounds: No wheezing or rhonchi.   Musculoskeletal:      Right lower leg: No edema.      Left lower leg: No edema.   Skin:      "General: Skin is warm.   Neurological:      Mental Status: He is alert. Mental status is at baseline.   Psychiatric:         Mood and Affect: Mood normal.         Pertinent Laboratory/Diagnostic Studies:    Laboratory studies reviewed personally by Gus Pillai MD    BMP:   Lab Results   Component Value Date    SODIUM 139 06/13/2024    K 4.4 06/13/2024     06/13/2024    CO2 26 06/13/2024    BUN 14 06/13/2024    CREATININE 0.83 06/13/2024    GLUC 96 06/13/2024    CALCIUM 9.6 06/13/2024     CBC:  Lab Results   Component Value Date    WBC 6.31 06/13/2024    HGB 15.8 06/13/2024    HCT 48.1 06/13/2024    MCV 89 06/13/2024     06/13/2024     Lipid Profile:   Lab Results   Component Value Date    HDL 31 (L) 04/18/2024     Lab Results   Component Value Date    LDLCALC 114 (H) 04/18/2024     Lab Results   Component Value Date    TRIG 248 (H) 04/18/2024      Other labs:  Lab Results   Component Value Date    GXA1EHVGUMAJ 1.310 04/09/2020     Lab Results   Component Value Date    ALT 31 06/13/2024    AST 22 06/13/2024       Imaging Studies:     Pertinent cardiac studies and imaging studies were personally reviewed on this visit and results summarized.        Portions of the record may have been created with voice recognition software.  Occasional wrong word or \"sound alike\" substitutions may have occurred due to the inherent limitations of voice recognition software.  Read the chart carefully and recognize, using context, where substitutions have occurred. Please reach out to me directly for any clarifications.  "

## 2024-08-19 NOTE — ASSESSMENT & PLAN NOTE
Recent labs reviewed.  LDL currently 114, HDL 31, triglycerides 248.  LFTs normal.  Diet and lifestyle modification advised.  Regular exercise will be helpful to elevate the HDL.  Coronary artery calcium scoring to guide further intensification of statin therapy.  His BMI is 29.7.  He is keen on losing weight.  Discussed calorie restriction and regular exercise to achieve negative caloric balance.  Mediterranean style diet advised.

## 2024-08-19 NOTE — ASSESSMENT & PLAN NOTE
Overall ASCVD risk about 5%.  Recent ER visit for chest pain.  Longstanding history of dyslipidemia and family history of premature CAD.  Further risk stratification planned with exercise treadmill stress test and calcium scoring.  Continue current statin therapy.  If calcium score abnormal, intensify statins.

## 2024-08-19 NOTE — PROGRESS NOTES
[facility] [place] [address]  [address]  [facility] [address]  Phone#  [phone]  Fax#  [phone]  [facility] Cardiology Office Consultation             NAME: Adolfo Magallon  AGE: 44 y.o. SEX: male   : 1980   MRN: [id number]    DATE: 2024  TIME: 2:07 PM    Cardiology Problem list:  Family history of CAD (Father had MI at age 34, mother in 50s), both have had CABG  Dyslipidemia  Chest pain: Left anterior fascicular block on EKG    Assessment and recommendations    Precordial pain  Overall ASCVD risk about 5%.  Recent ER visit for chest pain.  Longstanding history of dyslipidemia and family history of premature CAD.  Further risk stratification planned with exercise treadmill stress test and calcium scoring.  Continue current statin therapy.  If calcium score abnormal, intensify statins.    Hypercholesterolemia  Recent labs reviewed.  LDL currently 114, HDL 31, triglycerides 248.  LFTs normal.  Diet and lifestyle modification advised.  Regular exercise will be helpful to elevate the HDL.  Coronary artery calcium scoring to guide further intensification of statin therapy.  His BMI is 29.7.  He is keen on losing weight.  Discussed calorie restriction and regular exercise to achieve negative caloric balance.  Mediterranean style diet advised.    Essential hypertriglyceridemia  Last triglycerides were 248.  Primary approach will be dietary modification.        Chief Complaint   Patient presents with    New Patient Visit     Referred to after ER Visit for Chest Pains       HPI:    Adolfo Magallon is a 44 y.o.-year-old male who presents to the cardiology clinic for initial consultation.  He has been referred from the emergency room.    He was seen in the emergency room on 2024 with chest pain after having played a round of golf a few days prior.  He reported substernal chest pain that persisted over  few days before ER presentation that worsened after heavy meals.  His pain   also was aggravated by touch.  ER visit unremarkable.  He has longstanding history of hyperlipidemia for which she has been on statins.  He has a family history of cardiac problems.  ER workup reviewed.  Cardiac enzymes were normal.  EKG was normal.  Outpatient cardiology follow-up was advised.  Since that visit he has had no significant exertional chest pain.  Prior EKG personally reviewed: This is abnormal showing sinus rhythm with left posterior fascicular block and poor R wave progression.  No prior EKG before this 1 available.  He has a very strong family history of coronary artery disease.  Father had first MI at age 34 and mother had first MI in her 50s.  Both ended up having bypass surgery.  Father was a smoker.  Mother did not smoke.    Past history, family history, social history, current medications, vital signs, recent lab and imaging studies and  prior cardiology studies reviewed independently on this visit.    Allergies   Allergen Reactions    Bee Venom Swelling     At sting site    Seasonal Ic [Cholestatin] Itching       Current Outpatient Medications:     atorvastatin (LIPITOR) 10 mg tablet, TAKE 1 TABLET BY MOUTH EVERY DAY, Disp: 90 tablet, Rfl: 1    cetirizine (ZyrTEC) 5 MG tablet, Take 5 mg by mouth daily, Disp: , Rfl:     Multiple Vitamins-Minerals (PRESERVISION AREDS PO), Take by mouth, Disp: , Rfl:     Past Medical History:   Diagnosis Date    Allergic     GERD (gastroesophageal reflux disease)     High cholesterol     Seasonal allergies     feels well today regarding allergies 2/7/22     Past Surgical History:   Procedure Laterality Date    COLONOSCOPY      UPPER GASTROINTESTINAL ENDOSCOPY       Family History   Problem Relation Age of Onset    Heart disease Mother         cardiac disorder    Hypertension Mother     Heart attack Mother         prior     Heart disease Father 36        CABG    Asthma Brother      Social History   reports that he has never smoked. He quit smokeless tobacco use  about 2 years ago.  His smokeless tobacco use included snuff and chew. He reports current alcohol use. He reports that he does not use drugs.     Review of Systems   Constitutional:  Negative for fatigue.   HENT: Negative.     Eyes: Negative.    Respiratory:  Positive for chest tightness. Negative for shortness of breath.    Cardiovascular:  Positive for chest pain. Negative for palpitations and leg swelling.   Gastrointestinal: Negative.    Endocrine: Negative.    Neurological:  Negative for syncope.   Hematological: Negative.    All other systems reviewed and are negative.      Objective:     Vitals:    08/19/24 1341   BP: 132/82   Pulse: 81   SpO2: 98%     Physical Exam  Vitals reviewed.   Constitutional:       General: He is not in acute distress.  HENT:      Head: Normocephalic.   Neck:      Vascular: No carotid bruit.   Cardiovascular:      Rate and Rhythm: Normal rate and regular rhythm.      Heart sounds: S1 normal and S2 normal. No murmur heard.  Pulmonary:      Breath sounds: No wheezing or rhonchi.   Musculoskeletal:      Right lower leg: No edema.      Left lower leg: No edema.   Skin:     General: Skin is warm.   Neurological:      General: No focal deficit present.      Mental Status: He is alert.   Psychiatric:         Mood and Affect: Mood normal.         Pertinent Laboratory/Diagnostic Studies:    Laboratory studies reviewed personally by Gus Pillai MD    BMP:   Lab Results   Component Value Date    SODIUM 139 06/13/2024    K 4.4 06/13/2024     06/13/2024    CO2 26 06/13/2024    BUN 14 06/13/2024    CREATININE 0.83 06/13/2024    GLUC 96 06/13/2024    CALCIUM 9.6 06/13/2024     CBC:  Lab Results   Component Value Date    WBC 6.31 06/13/2024    HGB 15.8 06/13/2024    HCT 48.1 06/13/2024    MCV 89 06/13/2024     06/13/2024     Lipid Profile:   Lab Results   Component Value Date    HDL 31 (L) 04/18/2024     Lab Results   Component Value Date    LDLCALC 114 (H) 04/18/2024     Lab Results  "  Component Value Date    TRIG 248 (H) 04/18/2024      Other labs:  Lab Results   Component Value Date    [id number] 1.310 04/09/2020     Lab Results   Component Value Date    ALT 31 06/13/2024    AST 22 06/13/2024       Imaging Studies:     Pertinent cardiac studies and imaging studies were personally reviewed on this visit and results summarized.        Portions of the record may have been created with voice recognition software.  Occasional wrong word or \"sound alike\" substitutions may have occurred due to the inherent limitations of voice recognition software.  Read the chart carefully and recognize, using context, where substitutions have occurred. Please reach out to me directly for any clarifications.  "

## 2024-08-28 ENCOUNTER — HOSPITAL ENCOUNTER (OUTPATIENT)
Dept: CT IMAGING | Facility: HOSPITAL | Age: 44
Discharge: HOME | End: 2024-08-28
Attending: INTERNAL MEDICINE
Payer: COMMERCIAL

## 2024-08-28 DIAGNOSIS — R07.2 PRECORDIAL PAIN: ICD-10-CM

## 2024-08-28 DIAGNOSIS — E78.00 HYPERCHOLESTEROLEMIA: ICD-10-CM

## 2024-08-28 PROCEDURE — 75571 CT HRT W/O DYE W/CA TEST: CPT

## 2024-10-17 ENCOUNTER — HOSPITAL ENCOUNTER (OUTPATIENT)
Dept: NON INVASIVE DIAGNOSTICS | Facility: CLINIC | Age: 44
Discharge: HOME | End: 2024-10-17

## 2024-12-12 ENCOUNTER — HOSPITAL ENCOUNTER (OUTPATIENT)
Dept: NON INVASIVE DIAGNOSTICS | Facility: CLINIC | Age: 44
Discharge: HOME | End: 2024-12-12
Payer: COMMERCIAL

## 2024-12-12 VITALS
DIASTOLIC BLOOD PRESSURE: 94 MMHG | HEART RATE: 83 BPM | BODY MASS INDEX: 29.63 KG/M2 | WEIGHT: 207 LBS | SYSTOLIC BLOOD PRESSURE: 130 MMHG | HEIGHT: 70 IN | OXYGEN SATURATION: 99 %

## 2024-12-12 DIAGNOSIS — R07.2 PRECORDIAL PAIN: ICD-10-CM

## 2024-12-12 LAB
MAX HR PERCENT: 107 %
MAX HR: 187 BPM
RATE PRESSURE PRODUCT: NORMAL
SL CV STRESS RECOVERY BP: NORMAL MMHG
SL CV STRESS RECOVERY HR: 103 BPM
SL CV STRESS RECOVERY O2 SAT: 98 %
SL CV STRESS STAGE REACHED: 4
STRESS ANGINA INDEX: 0
STRESS BASELINE BP: NORMAL MMHG
STRESS BASELINE HR: 81 BPM
STRESS O2 SAT REST: 99 %
STRESS PEAK HR: 169 BPM
STRESS POST ESTIMATED WORKLOAD: 12.8 METS
STRESS POST EXERCISE DUR MIN: 10 MIN
STRESS POST EXERCISE DUR SEC: 39 SEC
STRESS POST O2 SAT PEAK: 98 %
STRESS POST PEAK BP: 152 MMHG

## 2024-12-12 PROCEDURE — 93018 CV STRESS TEST I&R ONLY: CPT | Performed by: INTERNAL MEDICINE

## 2024-12-12 PROCEDURE — 93017 CV STRESS TEST TRACING ONLY: CPT

## 2024-12-12 PROCEDURE — 93016 CV STRESS TEST SUPVJ ONLY: CPT | Performed by: INTERNAL MEDICINE

## 2024-12-13 LAB
CHEST PAIN STATEMENT: NORMAL
MAX DIASTOLIC BP: 82 MMHG
MAX PREDICTED HEART RATE: 174 BPM
PROTOCOL NAME: NORMAL
REASON FOR TERMINATION: NORMAL
STRESS POST EXERCISE DUR MIN: 10 MIN
STRESS POST EXERCISE DUR SEC: 39 SEC
STRESS POST PEAK HR: 187 BPM
STRESS POST PEAK SYSTOLIC BP: 192 MMHG
TARGET HR FORMULA: NORMAL
TEST INDICATION: NORMAL

## 2024-12-16 ENCOUNTER — RESULTS FOLLOW-UP (OUTPATIENT)
Dept: CARDIOLOGY CLINIC | Facility: CLINIC | Age: 44
End: 2024-12-16

## 2025-02-19 ENCOUNTER — OFFICE VISIT (OUTPATIENT)
Dept: CARDIOLOGY CLINIC | Facility: MEDICAL CENTER | Age: 45
End: 2025-02-19
Payer: COMMERCIAL

## 2025-02-19 VITALS
SYSTOLIC BLOOD PRESSURE: 130 MMHG | HEIGHT: 70 IN | BODY MASS INDEX: 30.92 KG/M2 | OXYGEN SATURATION: 97 % | WEIGHT: 216 LBS | DIASTOLIC BLOOD PRESSURE: 84 MMHG | HEART RATE: 88 BPM

## 2025-02-19 DIAGNOSIS — R07.2 PRECORDIAL PAIN: ICD-10-CM

## 2025-02-19 DIAGNOSIS — E88.810 METABOLIC SYNDROME: ICD-10-CM

## 2025-02-19 DIAGNOSIS — E78.2 MIXED DYSLIPIDEMIA: Primary | ICD-10-CM

## 2025-02-19 PROCEDURE — 99214 OFFICE O/P EST MOD 30 MIN: CPT | Performed by: INTERNAL MEDICINE

## 2025-02-19 NOTE — ASSESSMENT & PLAN NOTE
Lipids personally reviewed:  , triglycerides 248 and HDL 31.  Findings consistent with metabolic syndrome.  LFTs were normal in June.  Currently on low intensity statins.  Repeat lipids advised.  Also advised LP(a) levels previously but this was not done.

## 2025-02-19 NOTE — PROGRESS NOTES
[facility] [place] [address] [facility]  [address]  [facility] [address]  Phone#  [phone]  Fax#  [phone]  [facility]'s Cardiology Office Follow-up Visit             NAME: Adolfo Magallon  AGE: 44 y.o. SEX: male   : 1980   MRN: [id number]    DATE: 2025  TIME: 1:42 PM    Cardiology Problem list:  Family history of CAD (Father had MI at age 34, mother in 50s), both have had CABG  : Calcium score 9  : Stress test normal at 11 min  Dyslipidemia  Chest pain: Left anterior fascicular block on EKG  Hepatic steatosis    Assessment & Plan  Mixed dyslipidemia  Lipids personally reviewed:  , triglycerides 248 and HDL 31.  Findings consistent with metabolic syndrome.  LFTs were normal in .  Currently on low intensity statins.  Repeat lipids advised.  Also advised LP(a) levels previously but this was not done.           Metabolic syndrome  Current BMI 31.  Increased weight circumference noted.  Gained weight over the holidays.  HDL is low, triglycerides are elevated and LDL mildly elevated.  Blood pressure remains normal.  Discussed Mediterranean style, plant-based diet and regular exercise.  Also discussed intermittent fasting in which she was interested.  Make intensive dietary and lifestyle changes and then get repeat fasting lipids in 4 to 6 weeks.    Orders:    Hemoglobin A1C; Future    Precordial pain  Prior evaluation for precordial pain.  Low risk calcium score of 9.  No ischemia on treadmill stress test at 11 minutes.  Strong family history premature CAD.  Walks a mile with no angina. More sedentary in winter. Continue statin therapy and risk factor modification.  Monitor for recurrent symptoms.  Encouraged to increase walking to 2 miles per day, at least 5 times a week.                HPI:    Adolfo Magallon is a 44 y.o.-year-old male who presents to the cardiology clinic for follow up for the above-listed problems.  Last year his calcium score was 9.  Treadmill  stress test was normal at 11 minutes.  Both studies were personally reviewed.  EKG historically abnormal with left anterior fascicular block.  Has a family history of premature CAD.  Both parents have had CABG at relatively young age.  He has a history of hepatic steatosis as well.  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  Walks about a mile a day with his dog.  Does stretching exercises about once a week.  Gained weight over the holidays related to dietary indiscretion.  Has made some dietary changes now.  Repeat lipids are due in April.  LP(a) also advised during that blood draw.  He will probably need high intensity statins.  If LPa is  elevated, then add aspirin.  We discussed the finding of metabolic syndrome.      Past history, family history, social history, current medications, vital signs, recent lab and imaging studies and  prior cardiology studies reviewed independently on this visit.    Allergies   Allergen Reactions    Bee Venom Swelling     At sting site    Seasonal Ic [Cholestatin] Itching       Current Outpatient Medications:     atorvastatin (LIPITOR) 10 mg tablet, TAKE 1 TABLET BY MOUTH EVERY DAY, Disp: 90 tablet, Rfl: 1    cetirizine (ZyrTEC) 5 MG tablet, Take 5 mg by mouth daily, Disp: , Rfl:     Multiple Vitamins-Minerals (PRESERVISION AREDS PO), Take by mouth, Disp: , Rfl:     Review of Systems   Constitutional:  Negative for fatigue.   HENT: Negative.     Eyes: Negative.    Respiratory:  Negative for shortness of breath.    Cardiovascular:  Negative for chest pain, palpitations and leg swelling.   Gastrointestinal: Negative.    Endocrine: Negative.    Neurological:  Negative for syncope.   Hematological: Negative.    Psychiatric/Behavioral:  Negative for sleep disturbance.    All other systems reviewed and are negative.      Objective:     Vitals:    02/19/25 1318   BP: 130/84   Pulse: 88   SpO2: 97%     Wt Readings from Last 3 Encounters:   02/19/25 98 kg  "(216 lb)   12/12/24 93.9 kg (207 lb)   08/19/24 93.9 kg (207 lb)     Pulse Readings from Last 3 Encounters:   02/19/25 88   12/12/24 83   08/19/24 81     BP Readings from Last 3 Encounters:   02/19/25 130/84   12/12/24 130/94   08/19/24 132/82     Physical Exam  Vitals reviewed.   Constitutional:       General: He is not in acute distress.  HENT:      Head: Normocephalic.   Cardiovascular:      Rate and Rhythm: Normal rate and regular rhythm.      Heart sounds: No murmur heard.  Pulmonary:      Breath sounds: No wheezing or rales.   Musculoskeletal:         General: No swelling.   Skin:     General: Skin is warm.   Neurological:      General: No focal deficit present.      Mental Status: He is alert.   Psychiatric:         Mood and Affect: Mood normal.         Pertinent Laboratory/Diagnostic Studies:    Laboratory studies reviewed personally by Gus Pillai MD    BMP:   Lab Results   Component Value Date    SODIUM 139 06/13/2024    K 4.4 06/13/2024     06/13/2024    CO2 26 06/13/2024    BUN 14 06/13/2024    CREATININE 0.83 06/13/2024    GLUC 96 06/13/2024    CALCIUM 9.6 06/13/2024     CBC:  Lab Results   Component Value Date    WBC 6.31 06/13/2024    HGB 15.8 06/13/2024    HCT 48.1 06/13/2024    MCV 89 06/13/2024     06/13/2024     Lipid Profile:   Lab Results   Component Value Date    HDL 31 (L) 04/18/2024     Lab Results   Component Value Date    LDLCALC 114 (H) 04/18/2024     Lab Results   Component Value Date    TRIG 248 (H) 04/18/2024      Other labs:  Lab Results   Component Value Date    LFA3LEVFSLFE 1.310 04/09/2020     Lab Results   Component Value Date    ALT 31 06/13/2024    AST 22 06/13/2024     No results found for: \"HGBA1C\"      Imaging Studies:     Pertinent imaging studies and cardiac studies were independently reviewed on this visit and findings summarized.    I have spent a total time of 38  minutes in caring for this patient on the day of the visit/encounter including reviewing and " "entering of medical history, physical examination, diagnostic results, instructions for management, patient education, risk factor reduction, documenting in the medical record, sending communications to other providers, reviewing/ordering tests, medicine, procedures etc.    Gus Pillai MD, [facility]    Portions of the record may have been created with voice recognition software.  Occasional wrong word or \"sound alike\" substitutions may have occurred due to the inherent limitations of voice recognition software.  Read the chart carefully and recognize, using context, where substitutions have occurred. Please reach out to me directly for any clarifications.  "

## 2025-02-19 NOTE — PATIENT INSTRUCTIONS
Get your cholesterol checked soon.  Get lipoprotein a level and hemoglobin A1c checked with your next cholesterol draw.  Continue Mediterranean style, plant-based, low-cholesterol diet and exercise.    Continue efforts at diet modification, increased physical activity and avoidance of calorie dense foods.    Mediterranean style plant based diet and calorie restriction will be beneficial.    Avoid cholesterol rich foods such as egg yolk, red meat, high fat dairy and fried food.

## 2025-02-19 NOTE — ASSESSMENT & PLAN NOTE
Prior evaluation for precordial pain.  Low risk calcium score of 9.  No ischemia on treadmill stress test at 11 minutes.  Strong family history premature CAD.  Walks a mile with no angina. More sedentary in winter. Continue statin therapy and risk factor modification.  Monitor for recurrent symptoms.  Encouraged to increase walking to 2 miles per day, at least 5 times a week.

## 2025-03-16 DIAGNOSIS — E78.00 HYPERCHOLESTEROLEMIA: ICD-10-CM

## 2025-03-17 RX ORDER — ATORVASTATIN CALCIUM 10 MG/1
10 TABLET, FILM COATED ORAL DAILY
Qty: 90 TABLET | Refills: 1 | Status: SHIPPED | OUTPATIENT
Start: 2025-03-17

## 2025-04-02 ENCOUNTER — TELEPHONE (OUTPATIENT)
Dept: FAMILY MEDICINE CLINIC | Facility: CLINIC | Age: 45
End: 2025-04-02

## 2025-04-02 DIAGNOSIS — Z13.89 SCREENING FOR CARDIOVASCULAR, RESPIRATORY, AND GENITOURINARY DISEASES: ICD-10-CM

## 2025-04-02 DIAGNOSIS — E78.00 HYPERCHOLESTEROLEMIA: Primary | ICD-10-CM

## 2025-04-02 DIAGNOSIS — Z13.6 SCREENING FOR CARDIOVASCULAR, RESPIRATORY, AND GENITOURINARY DISEASES: ICD-10-CM

## 2025-04-02 DIAGNOSIS — Z13.83 SCREENING FOR CARDIOVASCULAR, RESPIRATORY, AND GENITOURINARY DISEASES: ICD-10-CM

## 2025-04-16 ENCOUNTER — APPOINTMENT (OUTPATIENT)
Dept: LAB | Facility: MEDICAL CENTER | Age: 45
End: 2025-04-16
Payer: COMMERCIAL

## 2025-04-16 DIAGNOSIS — E78.00 HYPERCHOLESTEROLEMIA: ICD-10-CM

## 2025-04-16 DIAGNOSIS — R07.2 PRECORDIAL PAIN: ICD-10-CM

## 2025-04-16 DIAGNOSIS — E88.810 METABOLIC SYNDROME: ICD-10-CM

## 2025-04-16 DIAGNOSIS — Z13.6 SCREENING FOR CARDIOVASCULAR, RESPIRATORY, AND GENITOURINARY DISEASES: ICD-10-CM

## 2025-04-16 DIAGNOSIS — Z13.83 SCREENING FOR CARDIOVASCULAR, RESPIRATORY, AND GENITOURINARY DISEASES: ICD-10-CM

## 2025-04-16 DIAGNOSIS — Z13.89 SCREENING FOR CARDIOVASCULAR, RESPIRATORY, AND GENITOURINARY DISEASES: ICD-10-CM

## 2025-04-16 LAB
ALBUMIN SERPL BCG-MCNC: 4.8 G/DL (ref 3.5–5)
ALP SERPL-CCNC: 48 U/L (ref 34–104)
ALT SERPL W P-5'-P-CCNC: 27 U/L (ref 7–52)
ANION GAP SERPL CALCULATED.3IONS-SCNC: 6 MMOL/L (ref 4–13)
AST SERPL W P-5'-P-CCNC: 18 U/L (ref 13–39)
BASOPHILS # BLD AUTO: 0.05 THOUSANDS/ÂΜL (ref 0–0.1)
BASOPHILS NFR BLD AUTO: 1 % (ref 0–1)
BILIRUB SERPL-MCNC: 0.69 MG/DL (ref 0.2–1)
BUN SERPL-MCNC: 14 MG/DL (ref 5–25)
CALCIUM SERPL-MCNC: 9.1 MG/DL (ref 8.4–10.2)
CHLORIDE SERPL-SCNC: 105 MMOL/L (ref 96–108)
CHOLEST SERPL-MCNC: 167 MG/DL (ref ?–200)
CO2 SERPL-SCNC: 28 MMOL/L (ref 21–32)
CREAT SERPL-MCNC: 0.9 MG/DL (ref 0.6–1.3)
EOSINOPHIL # BLD AUTO: 0.28 THOUSAND/ÂΜL (ref 0–0.61)
EOSINOPHIL NFR BLD AUTO: 4 % (ref 0–6)
ERYTHROCYTE [DISTWIDTH] IN BLOOD BY AUTOMATED COUNT: 11.9 % (ref 11.6–15.1)
EST. AVERAGE GLUCOSE BLD GHB EST-MCNC: 114 MG/DL
GFR SERPL CREATININE-BSD FRML MDRD: 102 ML/MIN/1.73SQ M
GLUCOSE P FAST SERPL-MCNC: 90 MG/DL (ref 65–99)
HBA1C MFR BLD: 5.6 %
HCT VFR BLD AUTO: 48.1 % (ref 36.5–49.3)
HDLC SERPL-MCNC: 32 MG/DL
HGB BLD-MCNC: 15.9 G/DL (ref 12–17)
IMM GRANULOCYTES # BLD AUTO: 0.01 THOUSAND/UL (ref 0–0.2)
IMM GRANULOCYTES NFR BLD AUTO: 0 % (ref 0–2)
LDLC SERPL CALC-MCNC: 87 MG/DL (ref 0–100)
LYMPHOCYTES # BLD AUTO: 3.25 THOUSANDS/ÂΜL (ref 0.6–4.47)
LYMPHOCYTES NFR BLD AUTO: 47 % (ref 14–44)
MCH RBC QN AUTO: 28.9 PG (ref 26.8–34.3)
MCHC RBC AUTO-ENTMCNC: 33.1 G/DL (ref 31.4–37.4)
MCV RBC AUTO: 88 FL (ref 82–98)
MONOCYTES # BLD AUTO: 0.71 THOUSAND/ÂΜL (ref 0.17–1.22)
MONOCYTES NFR BLD AUTO: 11 % (ref 4–12)
NEUTROPHILS # BLD AUTO: 2.45 THOUSANDS/ÂΜL (ref 1.85–7.62)
NEUTS SEG NFR BLD AUTO: 37 % (ref 43–75)
NONHDLC SERPL-MCNC: 135 MG/DL
NRBC BLD AUTO-RTO: 0 /100 WBCS
PLATELET # BLD AUTO: 299 THOUSANDS/UL (ref 149–390)
PMV BLD AUTO: 9.6 FL (ref 8.9–12.7)
POTASSIUM SERPL-SCNC: 4.3 MMOL/L (ref 3.5–5.3)
PROT SERPL-MCNC: 7.5 G/DL (ref 6.4–8.4)
RBC # BLD AUTO: 5.5 MILLION/UL (ref 3.88–5.62)
SODIUM SERPL-SCNC: 139 MMOL/L (ref 135–147)
TRIGL SERPL-MCNC: 238 MG/DL (ref ?–150)
WBC # BLD AUTO: 6.75 THOUSAND/UL (ref 4.31–10.16)

## 2025-04-16 PROCEDURE — 85025 COMPLETE CBC W/AUTO DIFF WBC: CPT

## 2025-04-16 PROCEDURE — 80053 COMPREHEN METABOLIC PANEL: CPT

## 2025-04-16 PROCEDURE — 80061 LIPID PANEL: CPT

## 2025-04-16 PROCEDURE — 83036 HEMOGLOBIN GLYCOSYLATED A1C: CPT

## 2025-04-16 PROCEDURE — 36415 COLL VENOUS BLD VENIPUNCTURE: CPT

## 2025-04-16 PROCEDURE — 83695 ASSAY OF LIPOPROTEIN(A): CPT

## 2025-04-17 LAB — LPA SERPL-SCNC: <9 NMOL/L

## 2025-04-23 ENCOUNTER — OFFICE VISIT (OUTPATIENT)
Dept: FAMILY MEDICINE CLINIC | Facility: CLINIC | Age: 45
End: 2025-04-23
Payer: COMMERCIAL

## 2025-04-23 VITALS
DIASTOLIC BLOOD PRESSURE: 74 MMHG | TEMPERATURE: 97.4 F | WEIGHT: 208.9 LBS | SYSTOLIC BLOOD PRESSURE: 120 MMHG | OXYGEN SATURATION: 98 % | BODY MASS INDEX: 29.91 KG/M2 | HEIGHT: 70 IN | HEART RATE: 114 BPM

## 2025-04-23 DIAGNOSIS — Z00.00 ANNUAL PHYSICAL EXAM: Primary | ICD-10-CM

## 2025-04-23 DIAGNOSIS — E78.2 MIXED DYSLIPIDEMIA: ICD-10-CM

## 2025-04-23 PROCEDURE — 99396 PREV VISIT EST AGE 40-64: CPT | Performed by: FAMILY MEDICINE

## 2025-04-23 NOTE — PROGRESS NOTES
Adult Annual Physical  Name: Adolfo Magallon      : 1980      MRN: [id number]  Encounter Provider: Michael Hoang MD  Encounter Date: 2025   Encounter department: [facility]    :  Assessment & Plan  Annual physical exam         Mixed dyslipidemia  LDL at goal.  Continue atorvastatin.  Recheck 1y               Immunizations:  - Immunizations due: Influenza and Tdap         History of Present Illness     Adult Annual Physical:  Patient presents for annual physical.   - has a mole he would likechecked  - no other concerns.     Diet and Physical Activity:  - Diet/Nutrition: well balanced diet. improving  - Exercise: moderate cardiovascular exercise. averages 2-3x a week    Depression Screening:  - PHQ-2 Score: 0    General Health:  - Sleep: sleeps well. 6-7h a night - better since switching to day shift  - Hearing: normal hearing bilateral ears.  - Vision: most recent eye exam < 1 year ago and vision problems. ? mild L MD  - Dental: regular dental visits and brushes teeth twice daily.     Health:  - History of STDs: no.   - Urinary symptoms: none.     Advanced Care Planning:  - Has an advanced directive?: no    - Has a durable medical POA?: no    - ACP document given to patient?: yes      Review of Systems   Constitutional: Negative.    HENT: Negative.     Eyes: Negative.    Respiratory: Negative.     Cardiovascular: Negative.    Gastrointestinal: Negative.    Endocrine: Negative.    Genitourinary: Negative.    Musculoskeletal: Negative.    Skin: Negative.    Allergic/Immunologic: Negative.    Neurological: Negative.    Hematological: Negative.    Psychiatric/Behavioral: Negative.       Past Medical History   Past Medical History:   Diagnosis Date   • Allergic    • GERD (gastroesophageal reflux disease)    • High cholesterol    • Seasonal allergies     feels well today regarding allergies 22     Past Surgical History:   Procedure Laterality Date   • COLONOSCOPY     •  "UPPER GASTROINTESTINAL ENDOSCOPY       Family History   Problem Relation Age of Onset   • Heart disease Mother         cardiac disorder   • Hypertension Mother    • Heart attack Mother         prior    • Heart disease Father 36        CABG   • Asthma Brother       reports that he has never smoked. He quit smokeless tobacco use about 3 years ago.  His smokeless tobacco use included snuff and chew. He reports current alcohol use. He reports that he does not use drugs.  Current Outpatient Medications   Medication Instructions   • atorvastatin (LIPITOR) 10 mg, Oral, Daily   • cetirizine (ZYRTEC) 5 mg, Daily   • Multiple Vitamins-Minerals (PRESERVISION AREDS PO) Take by mouth     Allergies   Allergen Reactions   • Bee Venom Swelling     At sting site   • Seasonal Ic [Cholestatin] Itching      Current Outpatient Medications on File Prior to Visit   Medication Sig Dispense Refill   • atorvastatin (LIPITOR) 10 mg tablet TAKE 1 TABLET BY MOUTH EVERY DAY 90 tablet 1   • cetirizine (ZyrTEC) 5 MG tablet Take 5 mg by mouth daily     • Multiple Vitamins-Minerals (PRESERVISION AREDS PO) Take by mouth       No current facility-administered medications on file prior to visit.      Social History     Tobacco Use   • Smoking status: Never   • Smokeless tobacco: Former     Types: Snuff, Chew     Quit date: 12/13/2021   • Tobacco comments:     On and off user since late 20’s, longest period of use was 6 years.   Vaping Use   • Vaping status: Never Used   Substance and Sexual Activity   • Alcohol use: Yes     Comment: Social drinker,  1 to 2 drinks a month.   • Drug use: No   • Sexual activity: Yes     Partners: Female       Objective   /74   Pulse (!) 114   Temp (!) 97.4 °F (36.3 °C)   Ht 5' 10\" (1.778 m)   Wt 94.8 kg (208 lb 14.4 oz)   SpO2 98%   BMI 29.97 kg/m²     Physical Exam  Vitals reviewed.   Constitutional:       Appearance: He is well-developed.   HENT:      Head: Normocephalic and atraumatic.      Right Ear: " Tympanic membrane, ear canal and external ear normal.      Left Ear: Ear canal and external ear normal.      Nose: Nose normal.      Mouth/Throat:      Mouth: Mucous membranes are moist.   Eyes:      Extraocular Movements: Extraocular movements intact.      Conjunctiva/sclera: Conjunctivae normal.      Pupils: Pupils are equal, round, and reactive to light.   Neck:      Thyroid: No thyromegaly.      Vascular: No JVD.   Cardiovascular:      Rate and Rhythm: Normal rate and regular rhythm.      Pulses: Normal pulses.      Heart sounds: Normal heart sounds. No murmur heard.  Pulmonary:      Effort: Pulmonary effort is normal. No respiratory distress.      Breath sounds: Normal breath sounds. No wheezing or rales.   Abdominal:      General: Bowel sounds are normal. There is no distension.      Palpations: Abdomen is soft. There is no mass.      Tenderness: There is no abdominal tenderness.   Musculoskeletal:         General: No swelling, tenderness or deformity. Normal range of motion.      Cervical back: Normal range of motion and neck supple. No muscular tenderness.      Right lower leg: No edema.      Left lower leg: No edema.   Lymphadenopathy:      Cervical: No cervical adenopathy.   Skin:     General: Skin is warm.      Capillary Refill: Capillary refill takes less than 2 seconds.      Comments: Few scattered SK   Neurological:      Mental Status: He is alert and oriented to person, place, and time.      Cranial Nerves: No cranial nerve deficit.      Sensory: No sensory deficit.      Motor: No weakness or abnormal muscle tone.      Coordination: Coordination normal.      Gait: Gait normal.      Deep Tendon Reflexes: Reflexes normal.   Psychiatric:         Mood and Affect: Mood normal.         Behavior: Behavior normal.         Thought Content: Thought content normal.         Judgment: Judgment normal.